# Patient Record
Sex: FEMALE | Race: WHITE | NOT HISPANIC OR LATINO | URBAN - METROPOLITAN AREA
[De-identification: names, ages, dates, MRNs, and addresses within clinical notes are randomized per-mention and may not be internally consistent; named-entity substitution may affect disease eponyms.]

---

## 2023-06-15 ENCOUNTER — ON CAMPUS - OUTPATIENT (OUTPATIENT)
Dept: URBAN - METROPOLITAN AREA HOSPITAL 2 | Facility: HOSPITAL | Age: 63
End: 2023-06-15
Payer: MEDICARE

## 2023-06-15 ENCOUNTER — OFFICE (OUTPATIENT)
Dept: URBAN - METROPOLITAN AREA PATHOLOGY 4 | Facility: PATHOLOGY | Age: 63
End: 2023-06-15
Payer: COMMERCIAL

## 2023-06-15 VITALS
HEART RATE: 74 BPM | OXYGEN SATURATION: 99 % | RESPIRATION RATE: 18 BRPM | TEMPERATURE: 97.7 F | RESPIRATION RATE: 16 BRPM | HEIGHT: 66 IN | DIASTOLIC BLOOD PRESSURE: 89 MMHG | RESPIRATION RATE: 19 BRPM | HEART RATE: 63 BPM | DIASTOLIC BLOOD PRESSURE: 64 MMHG | DIASTOLIC BLOOD PRESSURE: 74 MMHG | RESPIRATION RATE: 20 BRPM | OXYGEN SATURATION: 95 % | SYSTOLIC BLOOD PRESSURE: 110 MMHG | SYSTOLIC BLOOD PRESSURE: 118 MMHG | OXYGEN SATURATION: 92 % | SYSTOLIC BLOOD PRESSURE: 150 MMHG | HEART RATE: 71 BPM | HEART RATE: 68 BPM | DIASTOLIC BLOOD PRESSURE: 78 MMHG | SYSTOLIC BLOOD PRESSURE: 141 MMHG | DIASTOLIC BLOOD PRESSURE: 76 MMHG | DIASTOLIC BLOOD PRESSURE: 90 MMHG | OXYGEN SATURATION: 97 % | DIASTOLIC BLOOD PRESSURE: 95 MMHG | SYSTOLIC BLOOD PRESSURE: 105 MMHG | DIASTOLIC BLOOD PRESSURE: 80 MMHG | DIASTOLIC BLOOD PRESSURE: 71 MMHG | SYSTOLIC BLOOD PRESSURE: 124 MMHG | OXYGEN SATURATION: 98 % | SYSTOLIC BLOOD PRESSURE: 135 MMHG | HEART RATE: 64 BPM | WEIGHT: 176 LBS | SYSTOLIC BLOOD PRESSURE: 153 MMHG | HEART RATE: 77 BPM | HEART RATE: 76 BPM | SYSTOLIC BLOOD PRESSURE: 133 MMHG

## 2023-06-15 DIAGNOSIS — K56.600 PARTIAL INTESTINAL OBSTRUCTION, UNSPECIFIED AS TO CAUSE: ICD-10-CM

## 2023-06-15 DIAGNOSIS — C20 MALIGNANT NEOPLASM OF RECTUM: ICD-10-CM

## 2023-06-15 DIAGNOSIS — Z12.11 ENCOUNTER FOR SCREENING FOR MALIGNANT NEOPLASM OF COLON: ICD-10-CM

## 2023-06-15 DIAGNOSIS — K63.5 POLYP OF COLON: ICD-10-CM

## 2023-06-15 PROBLEM — D37.4 NEOPLASM OF UNCERTAIN BEHAVIOR OF COLON: Status: ACTIVE | Noted: 2023-06-15

## 2023-06-15 PROBLEM — K62.5 HEMORRHAGE OF ANUS AND RECTUM: Status: ACTIVE | Noted: 2023-06-15

## 2023-06-15 LAB
GI HISTOLOGY: A. UNSPECIFIED: (no result)
GI HISTOLOGY: B. UNSPECIFIED: (no result)
GI HISTOLOGY: PDF REPORT: (no result)

## 2023-06-15 PROCEDURE — 45380 COLONOSCOPY AND BIOPSY: CPT | Mod: 33 | Performed by: INTERNAL MEDICINE

## 2023-06-15 PROCEDURE — 88305 TISSUE EXAM BY PATHOLOGIST: CPT | Performed by: INTERNAL MEDICINE

## 2023-06-15 PROCEDURE — 88341 IMHCHEM/IMCYTCHM EA ADD ANTB: CPT | Performed by: INTERNAL MEDICINE

## 2023-06-15 PROCEDURE — 88342 IMHCHEM/IMCYTCHM 1ST ANTB: CPT | Performed by: INTERNAL MEDICINE

## 2023-06-15 RX ADMIN — ONDANSETRON HYDROCHLORIDE 4 MG: 4 SOLUTION ORAL at 08:54

## 2023-07-10 PROBLEM — K44.9 HIATAL HERNIA: Status: ACTIVE | Noted: 2023-06-27

## 2023-07-10 PROBLEM — K21.9 GASTRO-ESOPHAGEAL REFLUX DISEASE WITHOUT ESOPHAGITIS: Status: ACTIVE | Noted: 2021-09-28

## 2023-07-10 PROBLEM — N95.9 MENOPAUSAL AND POSTMENOPAUSAL DISORDER: Status: ACTIVE | Noted: 2022-11-08

## 2023-07-10 PROBLEM — F31.81 BIPOLAR II DISORDER: Status: ACTIVE | Noted: 2022-08-02

## 2023-07-10 PROBLEM — E88.810 METABOLIC SYNDROME: Status: ACTIVE | Noted: 2022-09-08

## 2023-07-10 PROBLEM — F41.1 GENERALIZED ANXIETY DISORDER: Status: ACTIVE | Noted: 2022-08-02

## 2023-07-10 PROBLEM — Z30.2 ENCOUNTER FOR TUBAL LIGATION: Status: ACTIVE | Noted: 2021-09-28

## 2023-07-10 PROBLEM — E78.5 HYPERLIPIDEMIA: Status: ACTIVE | Noted: 2022-09-08

## 2023-07-10 PROBLEM — I10 ESSENTIAL HYPERTENSION: Status: ACTIVE | Noted: 2023-01-10

## 2023-07-10 PROBLEM — Z79.890 HORMONE REPLACEMENT THERAPY: Status: ACTIVE | Noted: 2022-09-08

## 2023-07-10 PROBLEM — F41.9 ANXIETY: Status: ACTIVE | Noted: 2021-08-05

## 2023-07-10 PROBLEM — E88.81 METABOLIC SYNDROME: Status: ACTIVE | Noted: 2022-09-08

## 2023-07-10 PROBLEM — N99.85 POST ENDOMETRIAL ABLATION SYNDROME: Status: ACTIVE | Noted: 2021-09-28

## 2023-07-10 PROBLEM — C20 RECTAL CANCER: Status: ACTIVE | Noted: 2023-06-27

## 2023-07-10 PROBLEM — M47.812 ARTHROPATHY OF CERVICAL FACET JOINT: Status: ACTIVE | Noted: 2017-07-19

## 2023-07-10 PROBLEM — G89.29 CHRONIC PAIN: Status: ACTIVE | Noted: 2023-01-10

## 2023-07-10 PROBLEM — F34.1 DYSTHYMIA: Status: ACTIVE | Noted: 2023-01-10

## 2023-07-11 ENCOUNTER — HOSPITAL ENCOUNTER (OUTPATIENT)
Dept: RADIATION ONCOLOGY | Facility: HOSPITAL | Age: 63
Setting detail: RADIATION/ONCOLOGY SERIES
End: 2023-07-11
Payer: COMMERCIAL

## 2023-09-05 DIAGNOSIS — K52.1 CHEMOTHERAPY INDUCED DIARRHEA: Primary | ICD-10-CM

## 2023-09-05 DIAGNOSIS — T45.1X5A CHEMOTHERAPY INDUCED DIARRHEA: Primary | ICD-10-CM

## 2023-09-05 RX ORDER — DIPHENOXYLATE HYDROCHLORIDE AND ATROPINE SULFATE 2.5; .025 MG/1; MG/1
1-2 TABLET ORAL 4 TIMES DAILY PRN
Qty: 120 TABLET | Refills: 1 | Status: SHIPPED | OUTPATIENT
Start: 2023-09-05

## 2023-10-31 ENCOUNTER — OFFICE VISIT (OUTPATIENT)
Dept: RADIATION ONCOLOGY | Facility: HOSPITAL | Age: 63
End: 2023-10-31
Payer: COMMERCIAL

## 2023-10-31 ENCOUNTER — APPOINTMENT (OUTPATIENT)
Dept: OTHER | Facility: HOSPITAL | Age: 63
End: 2023-10-31
Payer: COMMERCIAL

## 2023-10-31 VITALS
SYSTOLIC BLOOD PRESSURE: 155 MMHG | TEMPERATURE: 98.3 F | WEIGHT: 174.2 LBS | OXYGEN SATURATION: 98 % | HEIGHT: 66 IN | RESPIRATION RATE: 18 BRPM | HEART RATE: 108 BPM | DIASTOLIC BLOOD PRESSURE: 72 MMHG | BODY MASS INDEX: 28 KG/M2

## 2023-10-31 DIAGNOSIS — C20 RECTAL CANCER: Primary | ICD-10-CM

## 2023-10-31 PROCEDURE — G0463 HOSPITAL OUTPT CLINIC VISIT: HCPCS | Performed by: RADIOLOGY

## 2023-10-31 RX ORDER — POTASSIUM CHLORIDE 750 MG/1
10 CAPSULE, EXTENDED RELEASE ORAL 2 TIMES DAILY
COMMUNITY

## 2023-10-31 RX ORDER — ONDANSETRON 4 MG/1
4 TABLET, FILM COATED ORAL EVERY 8 HOURS PRN
COMMUNITY

## 2023-10-31 RX ORDER — PROMETHAZINE HYDROCHLORIDE 12.5 MG/1
12.5 TABLET ORAL EVERY 6 HOURS PRN
COMMUNITY

## 2023-10-31 RX ORDER — LORAZEPAM 1 MG/1
1 TABLET ORAL EVERY 8 HOURS PRN
COMMUNITY

## 2023-10-31 NOTE — PROGRESS NOTES
RADIATION ONCOLOGY RE-EVALUATION NOTE    NAME: Xochitl Hanson  YOB: 1960  MRN #: [id number]  DATE OF SERVICE: 10/31/2023  REFERRING PROVIDER: Vannesa Fairchild MD    DIAGNOSIS: Rectal Adenocarcinoma, moderately differenatiated, T3N1M0     REASON FOR VISIT/CC:  Rectal cancer, Re-Eval during BECK    HISTORY OF PRESENT ILLNESS: The patient is a 63 y.o. year old female who was last seen in our office on 07/13/2023.    Again, her cancer history is as follows:  Xochitl Hanson is a 63 y.o. female who was recently diagnosed with rectal cancer after completing work-up for with ericka rectal bleeding presentation.  Patient had rectal bleeding for several months prior to work-up but amount had increased significantly 2 weeks prior to colonoscopy. No family history of colon cancer. No symptoms of weight loss, rectal pain, or difficulty moving bowels. Previous colonoscopy completed 10 years ago (2013).     She underwent colonoscopy on 6/15/2023 with Dr. Horan at [facility]. Findings include a single sessile 3 mm polyp of benign appearance found in the cecum (polypectomy), and ulcerated mass with stigmata of recent bleeding of malignant appearance found in the rectum at the distance between 12 cm and 16 cm from the anus causing partial obstruction. Multiple cold forceps biopsies were performed. Pathology from the polypectomy revealed mucosal polyp with predominant benign lymphoid aggregate, negative for adenoma. Pathology from the rectal mass revealed invasive moderately differentiated adenocarcinoma with focal mucinous component. No loss of mucoid expression of MMR proteins and no evidence of deficient mismatch pair.  She was referred to Dr. Tamayo.     CT CAP on 6/20/2023 at [facility] showed no evidence of metastatic disease in the chest. Large hiatal hernia. Rectal mass with suggestion of local invasion of adjacent fat with several small but suspicious lymph nodes adjacent to  the lesion. No evidence of distant metastatic disease to the liver or retroperitoneum.      She was seen in consult by Dr. Quintin Tamayo ([facility]) on 6/20/2023. Results of CT CAP pending at that time. Planned to obtain baseline labs including CBC, CMP, and CEA level   MRI of the rectum ordered to complete staging. She was referred to Dr. Fairchild for consideration of surgery.     She was seen in consult by Dr. Vannesa Fairchild ([facility]) on 6/27/2023. Rigid proctoscopy completed in the office. Tumor appeared to be in the upper rectum both on rigid proctoscopy and colonoscopy. If so, patient may be able to avoid XRT. Awaiting MRI and discussion at [facility]-D Tumor Board. Discussed that if early-stage or upper rectal, may go straight to surgery versus neoadjuvant chemo.     MRI at [place]--we are still needing report and copy of the radiology read.     [facility]-D Tumor Board on 7/5/2023 recommended neoadjuvant chemoradiation due to early T3a extension to muscularis propria and 5 mm lymph nodes noted on MRI. No EVMI.      She has had improvement on the neoadjuvant BECK, with great response seen on OSH CT.    No bleeding now  On 7th cycle of neoadjuvant chemo  Plan for 8th cycle of 11/13/2023    CT sim needed for 11/30/2023    She hasn't seen Dr. Fairchild over the interval.      The following portions of the patient's history were reviewed and updated as appropriate: allergies, current medications, past family history, past medical history, past social history, past surgical history and problem list. Reviewed with the patient and remain unchanged.    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    MEDICATIONS:    Current Outpatient Medications:     acetaminophen (TYLENOL) 650 MG 8 hr tablet, Take 1 tablet by mouth Every 8 (Eight) Hours As Needed., Disp: , Rfl:     cetirizine (zyrTEC) 10 MG tablet, Take 1 tablet by mouth Daily., Disp: , Rfl:     diphenoxylate-atropine (Lomotil) 2.5-0.025 MG per tablet, Take 1-2 tablets by mouth 4 (Four) Times a Day As Needed for Diarrhea., Disp: 120 tablet, Rfl: 1    DULoxetine (CYMBALTA) 20 MG capsule, Take 1 capsule by mouth Daily., Disp: , Rfl:     esomeprazole (nexIUM) 40 MG capsule, Take 1 capsule by mouth Every Morning Before Breakfast., Disp: , Rfl:     hydrOXYzine (ATARAX) 50 MG tablet, Take 1 tablet by mouth 3 (Three) Times a Day As Needed., Disp: , Rfl:     ibuprofen (ADVIL,MOTRIN) 200 MG tablet, Take 1 tablet by mouth Every 6 (Six) Hours As Needed., Disp: , Rfl:     lisinopril (PRINIVIL,ZESTRIL) 20 MG tablet, Take 1 tablet by mouth Daily., Disp: , Rfl:     methocarbamol (ROBAXIN) 750 MG tablet, Take 1 tablet by mouth Every 8 (Eight) Hours As Needed., Disp: , Rfl:     multivitamin (THERAGRAN) tablet tablet, Take 1 tablet by mouth Daily., Disp: , Rfl:     ALLERGIES:  No Known Allergies    PAST SURGICAL HISTORY:  she has a past surgical history that includes Tubal ligation and Endometrial ablation.    PREVIOUS RADIOTHERAPY OR CHEMOTHERAPY:  BECK, no XRT    FAMILY HISTORY:  herfamily history includes Prostate cancer in her brother.    SOCIAL HISTORY:  she reports that she has never smoked. She has never used smokeless tobacco. She reports that she does not drink alcohol and does not use drugs.    PAIN AND PAIN MANAGEMENT:  no pain.  Vitals:     "10/31/23 0935   Temp: 98.3 °F (36.8 °C)   TempSrc: Oral   Height: 167.6 cm (66\")       NUTRITIONAL STATUS:   no issues    KPS:  90  PHQ-9 Total Score: distress screening tool completed.    REVIEW OF SYSTEMS:   General: No fevers, chills, weight change, or drenching night sweats. Skin: No rashes or jaundice.  HEENT: No change in vision or hearing, no headaches.  Neck: No dysphagia or masses.  Heme/Lymph: No easy bruising or bleeding.  Respiratory System: No shortness of breath or cough.  Cardiovascular: No chest pain, palpitations, or dyspnea on exertion.  - Pacemaker. GI: No nausea, vomiting, diarrhea, melena, or hematochezia.  : No dysuria or hematuria.  Endocrine: No heat or cold intolerance. Musculoskeletal: No myalgias or arthralgias.  Neuro: No weakness, numbness, syncope, or seizures. Psych: No mood changes or depression. Ext: Denies swelling.    Objective   VITAL SIGNS:  Vitals:    10/31/23 0935   Temp: 98.3 °F (36.8 °C)   TempSrc: Oral   Height: 167.6 cm (66\")     PHYSICAL EXAM:  GENERAL: In no apparent distress, sitting comfortably in room.    HEENT: Normocephalic, atraumatic. Pupils are equal, round, reactive to light. Sclera anicteric. Conjunctiva not injected. Oropharynx without erythema, ulcerations or thrush.   NECK: Supple with no masses.  LYMPHATIC: No cervical, supraclavicular or axillary adenopathy appreciated bilaterally.   CARDIOVASCULAR: S1 & S2 detected; no murmurs, rubs or gallops.  CHEST: Clear to auscultation bilaterally; no wheezes, crackles or rubs. Work of breathing normal.  ABDOMEN: Bowel sounds present. Abdomen is soft, nontender, nondistended.   MUSCULOSKELETAL: No tenderness to palpation along the spine or scapulae. Normal range of motion.  EXTREMITIES: No clubbing, cyanosis, edema.  SKIN: No erythema, rashes, ulcerations noted.   NEUROLOGIC: Cranial nerves II-XII grossly intact bilaterally. No focal neurologic deficits.  PSYCHIATRIC:  Alert, aware, and appropriate.      PERTINENT " IMAGING/PATHOLOGY/LABS (Medical Decision Making):     COORDINATION OF CARE: A copy of this note is sent to the referring provider.    PATHOLOGY (Reviewed):     IMAGING (Reviewed): OSH scans reviewed as noted.    LABS (Reviewed):  HEMATOLOGY:  WBC   Date Value Ref Range Status   07/19/2023 7.40 3.40 - 10.80 10*3/mm3 Final     RBC   Date Value Ref Range Status   07/19/2023 4.59 3.77 - 5.28 10*6/mm3 Final     Hemoglobin   Date Value Ref Range Status   07/19/2023 13.3 12.0 - 15.9 g/dL Final     Hematocrit   Date Value Ref Range Status   07/19/2023 40.7 34.0 - 46.6 % Final     Platelets   Date Value Ref Range Status   07/19/2023 354 140 - 450 10*3/mm3 Final     CHEMISTRY:    Assessment & Plan   ASSESSMENT AND PLAN:    Rectal Adenocarcinoma, moderately differentiated, T3N1M0.  -case has been reviewed and evaluated by Dr. Fairchild and planning for BECK, chemoXRT and eval for NOMS vs. Surgery  -Now at 7 of 8 planned BECK cycles  -good response seen on OSH scans  -CT sim to be ordered and we will coordinate care    This assessment comes from my review of the imaging, pathology, physician notes and other pertinent information as mentioned.    DISPOSITION: CT sim for 11/30/2023 after completion of BECK systemic therapy and then will coordinate start with Dr. Tamayo's team.    TIME SPENT WITH PATIENT:   I spent 22 minutes caring for Xochitl on this date of service. This time includes time spent by me in the following activities: preparing for the visit, reviewing tests, obtaining and/or reviewing a separately obtained history, performing a medically appropriate examination and/or evaluation, counseling and educating the patient/family/caregiver, documenting information in the medical record, independently interpreting results and communicating that information with the patient/family/caregiver, and care coordination    CC: Quintin Tamayo MD      Approved by:     Dave Acosta MD

## 2023-11-28 ENCOUNTER — HOSPITAL ENCOUNTER (OUTPATIENT)
Dept: RADIATION ONCOLOGY | Facility: HOSPITAL | Age: 63
Setting detail: RADIATION/ONCOLOGY SERIES
End: 2023-11-28
Payer: COMMERCIAL

## 2023-11-30 PROCEDURE — 77334 RADIATION TREATMENT AID(S): CPT | Performed by: RADIOLOGY

## 2023-12-07 ENCOUNTER — HOSPITAL ENCOUNTER (OUTPATIENT)
Dept: RADIATION ONCOLOGY | Facility: HOSPITAL | Age: 63
Setting detail: RADIATION/ONCOLOGY SERIES
End: 2023-12-07
Payer: COMMERCIAL

## 2023-12-08 PROCEDURE — 77263 THER RADIOLOGY TX PLNG CPLX: CPT | Performed by: RADIOLOGY

## 2023-12-10 PROCEDURE — 77338 DESIGN MLC DEVICE FOR IMRT: CPT | Performed by: RADIOLOGY

## 2023-12-10 PROCEDURE — 77300 RADIATION THERAPY DOSE PLAN: CPT | Performed by: RADIOLOGY

## 2023-12-10 PROCEDURE — 77301 RADIOTHERAPY DOSE PLAN IMRT: CPT | Performed by: RADIOLOGY

## 2023-12-11 ENCOUNTER — RADIATION ONCOLOGY WEEKLY ASSESSMENT (OUTPATIENT)
Dept: RADIATION ONCOLOGY | Facility: HOSPITAL | Age: 63
End: 2023-12-11
Payer: COMMERCIAL

## 2023-12-11 ENCOUNTER — HOSPITAL ENCOUNTER (OUTPATIENT)
Dept: RADIATION ONCOLOGY | Facility: HOSPITAL | Age: 63
Discharge: HOME | End: 2023-12-11
Payer: COMMERCIAL

## 2023-12-11 VITALS
BODY MASS INDEX: 27.9 KG/M2 | TEMPERATURE: 98.2 F | HEIGHT: 66 IN | HEART RATE: 93 BPM | RESPIRATION RATE: 18 BRPM | OXYGEN SATURATION: 100 % | SYSTOLIC BLOOD PRESSURE: 156 MMHG | WEIGHT: 173.6 LBS | DIASTOLIC BLOOD PRESSURE: 81 MMHG

## 2023-12-11 DIAGNOSIS — C20 RECTAL CANCER: Primary | ICD-10-CM

## 2023-12-11 LAB
RAD ONC ARIA COURSE ID: NORMAL
RAD ONC ARIA COURSE LAST TREATMENT DATE: NORMAL
RAD ONC ARIA COURSE START DATE: NORMAL
RAD ONC ARIA COURSE TREATMENT ELAPSED DAYS: 0
RAD ONC ARIA FIRST TREATMENT DATE: NORMAL
RAD ONC ARIA PLAN FRACTIONS TREATED TO DATE: 1
RAD ONC ARIA PLAN ID: NORMAL
RAD ONC ARIA PLAN PRESCRIBED DOSE PER FRACTION: 1.8 GY
RAD ONC ARIA PLAN PRIMARY REFERENCE POINT: NORMAL
RAD ONC ARIA PLAN TOTAL FRACTIONS PRESCRIBED: 25
RAD ONC ARIA PLAN TOTAL PRESCRIBED DOSE: 4500 CGY
RAD ONC ARIA REFERENCE POINT DOSAGE GIVEN TO DATE: 1.8 GY
RAD ONC ARIA REFERENCE POINT ID: NORMAL
RAD ONC ARIA REFERENCE POINT SESSION DOSAGE GIVEN: 1.8 GY

## 2023-12-11 PROCEDURE — 77386: CPT | Performed by: RADIOLOGY

## 2023-12-11 PROCEDURE — 77427 RADIATION TX MANAGEMENT X5: CPT | Performed by: RADIOLOGY

## 2023-12-11 PROCEDURE — 77014 CHG CT GUIDANCE RADIATION THERAPY FLDS PLACEMENT: CPT | Performed by: RADIOLOGY

## 2023-12-11 PROCEDURE — FACE2FACE: Performed by: RADIOLOGY

## 2023-12-11 NOTE — PROGRESS NOTES
"NAME: Xochitl Hanson DATE: 2023   : 1960    MRN: [id number] DIAGNOSIS:   Encounter Diagnosis   Name Primary?    Rectal cancer Yes          RADIATION ON TREATMENT VISIT NOTE - PELVIS    Treatment Summary    [x] Concurrent Chemo   Regimen: Fluorouracil weekly      Treatment Site Ref. ID Energy Dose/Fx (cGy) #Fx Dose Correction (cGy) Total Dose (cGy) Start Date End Date Elapsed Days   Pelvis init Pelvis 10X 180  0 180 2023 0       General     Review of Systems:  [x] No new complaints  [] Nocturia  [] Slow urinary stream  [] Difficulty initiating urination [] Dysuria  [] Vaginal discharge  [] Increased frequency of urination [] Soft/loose stool [] Diarrhea  [] Rectal burning   [] Skin soreness, location:   [x] Fatigue, severity: 0  [x] Pain, severity: 0, location: denies pain  [] Bladder medication regimen:    [] Pain medication regimen:    [x] Bowel regimen:  discussed imodium  [x] Skin regimen:  discussed aquaphor    Subjective:  She started treatments today, she doesn't have any concerns or complaints at this time.    KPS: 90     Vital Signs: /81   Pulse 93   Temp 98.2 °F (36.8 °C) (Oral)   Resp 18   Ht 167.6 cm (66\")   Wt 78.7 kg (173 lb 9.6 oz)   SpO2 100%   BMI 28.02 kg/m²     Weight:   Wt Readings from Last 3 Encounters:   23 78.7 kg (173 lb 9.6 oz)   10/31/23 79 kg (174 lb 3.2 oz)   23 83.9 kg (185 lb)       Medication:   Current Outpatient Medications:     acetaminophen (TYLENOL) 650 MG 8 hr tablet, Take 1 tablet by mouth Every 8 (Eight) Hours As Needed., Disp: , Rfl:     cetirizine (zyrTEC) 10 MG tablet, Take 1 tablet by mouth Daily., Disp: , Rfl:     diphenoxylate-atropine (Lomotil) 2.5-0.025 MG per tablet, Take 1-2 tablets by mouth 4 (Four) Times a Day As Needed for Diarrhea., Disp: 120 tablet, Rfl: 1    DULoxetine (CYMBALTA) 20 MG capsule, Take 1 capsule by mouth Daily., Disp: , Rfl:     esomeprazole (nexIUM) 40 MG capsule, Take 1 capsule by " "mouth Every Morning Before Breakfast., Disp: , Rfl:     hydrOXYzine (ATARAX) 50 MG tablet, Take 1 tablet by mouth 3 (Three) Times a Day As Needed. (Patient not taking: Reported on 10/31/2023), Disp: , Rfl:     ibuprofen (ADVIL,MOTRIN) 200 MG tablet, Take 1 tablet by mouth Every 6 (Six) Hours As Needed., Disp: , Rfl:     lisinopril (PRINIVIL,ZESTRIL) 20 MG tablet, Take 1 tablet by mouth Daily., Disp: , Rfl:     LORazepam (ATIVAN) 1 MG tablet, Take 1 tablet by mouth Every 8 (Eight) Hours As Needed for Anxiety., Disp: , Rfl:     methocarbamol (ROBAXIN) 750 MG tablet, Take 1 tablet by mouth Every 8 (Eight) Hours As Needed., Disp: , Rfl:     multivitamin (THERAGRAN) tablet tablet, Take 1 tablet by mouth Daily., Disp: , Rfl:     ondansetron (ZOFRAN) 4 MG tablet, Take 1 tablet by mouth Every 8 (Eight) Hours As Needed for Nausea or Vomiting., Disp: , Rfl:     potassium chloride (MICRO-K) 10 MEQ CR capsule, Take 1 capsule by mouth 2 (Two) Times a Day., Disp: , Rfl:     promethazine (PHENERGAN) 12.5 MG tablet, Take 1 tablet by mouth Every 6 (Six) Hours As Needed for Nausea or Vomiting., Disp: , Rfl:      LABS (Reviewed):  Hematology  WBC   Date Value Ref Range Status   07/19/2023 7.40 3.40 - 10.80 10*3/mm3 Final     RBC   Date Value Ref Range Status   07/19/2023 4.59 3.77 - 5.28 10*6/mm3 Final     Hemoglobin   Date Value Ref Range Status   07/19/2023 13.3 12.0 - 15.9 g/dL Final     Hematocrit   Date Value Ref Range Status   07/19/2023 40.7 34.0 - 46.6 % Final     Platelets   Date Value Ref Range Status   07/19/2023 354 140 - 450 10*3/mm3 Final     Chemistry No results found for: \"GLUCOSE\", \"BUN\", \"CREATININE\", \"EGFRIFNONA\", \"EGFRIFAFRI\", \"BCR\", \"K\", \"CO2\", \"CALCIUM\", \"PROTENTOTREF\", \"ALBUMIN\", \"LABIL2\", \"BILIRUBIN\", \"AST\", \"ALT\"    Physical Exam     Abdomen: [x] Soft          [x] Bowel sounds   GYN: [x] No Vaginal discharge   Extremities: [] Edema   GI: [x] No Diarrhea   [] Enteritis  [] Proctitis    [] Vomiting "   Renal/Genitourinary: [] Cystitis     [] Bladder spasms  [] Incontinence   Skin: [x] stGstrstastdstest:st st1st Comments/Notes:      [x] Review of labs, images, dosimetry, dose delivered, & treatment parameters.    Comments:     [x] Patient treatment setup reviewed.    Comments:     Recommendations:  continue XRT and supportive measures    [x] Continue RT  [] Change RT Plan [] Hold RT, length:      Approved Electronically By:  Dave Acosta MD, 12/11/2023, 15:19 EST      Confidentiality of this medical record shall be maintained except when use or disclosure is required or permitted by law, regulation or written authorization by the patient.

## 2023-12-12 ENCOUNTER — HOSPITAL ENCOUNTER (OUTPATIENT)
Dept: RADIATION ONCOLOGY | Facility: HOSPITAL | Age: 63
Discharge: HOME | End: 2023-12-12

## 2023-12-12 LAB
RAD ONC ARIA COURSE ID: NORMAL
RAD ONC ARIA COURSE LAST TREATMENT DATE: NORMAL
RAD ONC ARIA COURSE START DATE: NORMAL
RAD ONC ARIA COURSE TREATMENT ELAPSED DAYS: 1
RAD ONC ARIA FIRST TREATMENT DATE: NORMAL
RAD ONC ARIA PLAN FRACTIONS TREATED TO DATE: 2
RAD ONC ARIA PLAN ID: NORMAL
RAD ONC ARIA PLAN PRESCRIBED DOSE PER FRACTION: 1.8 GY
RAD ONC ARIA PLAN PRIMARY REFERENCE POINT: NORMAL
RAD ONC ARIA PLAN TOTAL FRACTIONS PRESCRIBED: 25
RAD ONC ARIA PLAN TOTAL PRESCRIBED DOSE: 4500 CGY
RAD ONC ARIA REFERENCE POINT DOSAGE GIVEN TO DATE: 3.6 GY
RAD ONC ARIA REFERENCE POINT ID: NORMAL
RAD ONC ARIA REFERENCE POINT SESSION DOSAGE GIVEN: 1.8 GY

## 2023-12-12 PROCEDURE — 77014 CHG CT GUIDANCE RADIATION THERAPY FLDS PLACEMENT: CPT | Performed by: RADIOLOGY

## 2023-12-12 PROCEDURE — 77386: CPT | Performed by: RADIOLOGY

## 2023-12-13 ENCOUNTER — HOSPITAL ENCOUNTER (OUTPATIENT)
Dept: RADIATION ONCOLOGY | Facility: HOSPITAL | Age: 63
Discharge: HOME | End: 2023-12-13

## 2023-12-13 LAB
RAD ONC ARIA COURSE ID: NORMAL
RAD ONC ARIA COURSE LAST TREATMENT DATE: NORMAL
RAD ONC ARIA COURSE START DATE: NORMAL
RAD ONC ARIA COURSE TREATMENT ELAPSED DAYS: 2
RAD ONC ARIA FIRST TREATMENT DATE: NORMAL
RAD ONC ARIA PLAN FRACTIONS TREATED TO DATE: 3
RAD ONC ARIA PLAN ID: NORMAL
RAD ONC ARIA PLAN PRESCRIBED DOSE PER FRACTION: 1.8 GY
RAD ONC ARIA PLAN PRIMARY REFERENCE POINT: NORMAL
RAD ONC ARIA PLAN TOTAL FRACTIONS PRESCRIBED: 25
RAD ONC ARIA PLAN TOTAL PRESCRIBED DOSE: 4500 CGY
RAD ONC ARIA REFERENCE POINT DOSAGE GIVEN TO DATE: 5.4 GY
RAD ONC ARIA REFERENCE POINT ID: NORMAL
RAD ONC ARIA REFERENCE POINT SESSION DOSAGE GIVEN: 1.8 GY

## 2023-12-13 PROCEDURE — 77386: CPT | Performed by: RADIOLOGY

## 2023-12-13 PROCEDURE — 77336 RADIATION PHYSICS CONSULT: CPT | Performed by: RADIOLOGY

## 2023-12-13 PROCEDURE — 77014 CHG CT GUIDANCE RADIATION THERAPY FLDS PLACEMENT: CPT | Performed by: RADIOLOGY

## 2023-12-14 ENCOUNTER — HOSPITAL ENCOUNTER (OUTPATIENT)
Dept: RADIATION ONCOLOGY | Facility: HOSPITAL | Age: 63
Discharge: HOME | End: 2023-12-14

## 2023-12-14 LAB
RAD ONC ARIA COURSE ID: NORMAL
RAD ONC ARIA COURSE LAST TREATMENT DATE: NORMAL
RAD ONC ARIA COURSE START DATE: NORMAL
RAD ONC ARIA COURSE TREATMENT ELAPSED DAYS: 3
RAD ONC ARIA FIRST TREATMENT DATE: NORMAL
RAD ONC ARIA PLAN FRACTIONS TREATED TO DATE: 4
RAD ONC ARIA PLAN ID: NORMAL
RAD ONC ARIA PLAN PRESCRIBED DOSE PER FRACTION: 1.8 GY
RAD ONC ARIA PLAN PRIMARY REFERENCE POINT: NORMAL
RAD ONC ARIA PLAN TOTAL FRACTIONS PRESCRIBED: 25
RAD ONC ARIA PLAN TOTAL PRESCRIBED DOSE: 4500 CGY
RAD ONC ARIA REFERENCE POINT DOSAGE GIVEN TO DATE: 7.2 GY
RAD ONC ARIA REFERENCE POINT ID: NORMAL
RAD ONC ARIA REFERENCE POINT SESSION DOSAGE GIVEN: 1.8 GY

## 2023-12-14 PROCEDURE — 77014 CHG CT GUIDANCE RADIATION THERAPY FLDS PLACEMENT: CPT | Performed by: RADIOLOGY

## 2023-12-14 PROCEDURE — 77386: CPT | Performed by: RADIOLOGY

## 2023-12-15 ENCOUNTER — HOSPITAL ENCOUNTER (OUTPATIENT)
Dept: RADIATION ONCOLOGY | Facility: HOSPITAL | Age: 63
Discharge: HOME | End: 2023-12-15

## 2023-12-15 LAB
RAD ONC ARIA COURSE ID: NORMAL
RAD ONC ARIA COURSE LAST TREATMENT DATE: NORMAL
RAD ONC ARIA COURSE START DATE: NORMAL
RAD ONC ARIA COURSE TREATMENT ELAPSED DAYS: 4
RAD ONC ARIA FIRST TREATMENT DATE: NORMAL
RAD ONC ARIA PLAN FRACTIONS TREATED TO DATE: 5
RAD ONC ARIA PLAN ID: NORMAL
RAD ONC ARIA PLAN PRESCRIBED DOSE PER FRACTION: 1.8 GY
RAD ONC ARIA PLAN PRIMARY REFERENCE POINT: NORMAL
RAD ONC ARIA PLAN TOTAL FRACTIONS PRESCRIBED: 25
RAD ONC ARIA PLAN TOTAL PRESCRIBED DOSE: 4500 CGY
RAD ONC ARIA REFERENCE POINT DOSAGE GIVEN TO DATE: 9 GY
RAD ONC ARIA REFERENCE POINT ID: NORMAL
RAD ONC ARIA REFERENCE POINT SESSION DOSAGE GIVEN: 1.8 GY

## 2023-12-15 PROCEDURE — 77386: CPT | Performed by: INTERNAL MEDICINE

## 2023-12-15 PROCEDURE — 77014 CHG CT GUIDANCE RADIATION THERAPY FLDS PLACEMENT: CPT | Performed by: INTERNAL MEDICINE

## 2023-12-18 ENCOUNTER — RADIATION ONCOLOGY WEEKLY ASSESSMENT (OUTPATIENT)
Dept: RADIATION ONCOLOGY | Facility: HOSPITAL | Age: 63
End: 2023-12-18
Payer: COMMERCIAL

## 2023-12-18 ENCOUNTER — HOSPITAL ENCOUNTER (OUTPATIENT)
Dept: RADIATION ONCOLOGY | Facility: HOSPITAL | Age: 63
Discharge: HOME | End: 2023-12-18
Payer: COMMERCIAL

## 2023-12-18 VITALS
SYSTOLIC BLOOD PRESSURE: 128 MMHG | HEART RATE: 101 BPM | DIASTOLIC BLOOD PRESSURE: 78 MMHG | OXYGEN SATURATION: 96 % | RESPIRATION RATE: 18 BRPM | BODY MASS INDEX: 28.08 KG/M2 | WEIGHT: 174 LBS

## 2023-12-18 DIAGNOSIS — C20 RECTAL CANCER: Primary | ICD-10-CM

## 2023-12-18 LAB
RAD ONC ARIA COURSE ID: NORMAL
RAD ONC ARIA COURSE LAST TREATMENT DATE: NORMAL
RAD ONC ARIA COURSE START DATE: NORMAL
RAD ONC ARIA COURSE TREATMENT ELAPSED DAYS: 7
RAD ONC ARIA FIRST TREATMENT DATE: NORMAL
RAD ONC ARIA PLAN FRACTIONS TREATED TO DATE: 6
RAD ONC ARIA PLAN ID: NORMAL
RAD ONC ARIA PLAN PRESCRIBED DOSE PER FRACTION: 1.8 GY
RAD ONC ARIA PLAN PRIMARY REFERENCE POINT: NORMAL
RAD ONC ARIA PLAN TOTAL FRACTIONS PRESCRIBED: 25
RAD ONC ARIA PLAN TOTAL PRESCRIBED DOSE: 4500 CGY
RAD ONC ARIA REFERENCE POINT DOSAGE GIVEN TO DATE: 10.8 GY
RAD ONC ARIA REFERENCE POINT ID: NORMAL
RAD ONC ARIA REFERENCE POINT SESSION DOSAGE GIVEN: 1.8 GY

## 2023-12-18 PROCEDURE — 77014 CHG CT GUIDANCE RADIATION THERAPY FLDS PLACEMENT: CPT | Performed by: RADIOLOGY

## 2023-12-18 PROCEDURE — FACE2FACE: Performed by: RADIOLOGY

## 2023-12-18 PROCEDURE — 77386: CPT | Performed by: RADIOLOGY

## 2023-12-18 PROCEDURE — 77427 RADIATION TX MANAGEMENT X5: CPT | Performed by: RADIOLOGY

## 2023-12-18 NOTE — PROGRESS NOTES
NAME: Xochitl Hanson DATE: 2023   : 1960    MRN: [id number] DIAGNOSIS:   Encounter Diagnosis   Name Primary?    Rectal cancer Yes          RADIATION ON TREATMENT VISIT NOTE - PELVIS    Treatment Summary    [x] Concurrent Chemo   Regimen:  5FU weekly ([facility])      Treatment Site Ref. ID Energy Dose/Fx (cGy) #Fx Dose Correction (cGy) Total Dose (cGy) Start Date End Date Elapsed Days   Pelvis init Pelvis 10X 180  0 1,080 2023 7       General     Review of Systems:  [] No new complaints  [] Nocturia  [] Slow urinary stream  [] Difficulty initiating urination [] Dysuria  [] Vaginal discharge  [] Increased frequency of urination [] Soft/loose stool [] Diarrhea  [] Rectal burning   [] Skin soreness, location:   [x] Fatigue, severity: 1  [] Pain, severity: 0, location:   [] Bladder medication regimen:    [] Pain medication regimen:    [] Bowel regimen:  Lomotil PRN  [] Skin regimen:      Subjective:  No complaints at this time.    KPS: 90     Vital Signs: /78   Pulse 101   Resp 18   Wt 78.9 kg (174 lb)   SpO2 96%   BMI 28.08 kg/m²     Weight:   Wt Readings from Last 3 Encounters:   23 78.9 kg (174 lb)   23 78.7 kg (173 lb 9.6 oz)   10/31/23 79 kg (174 lb 3.2 oz)       Medication:   Current Outpatient Medications:     acetaminophen (TYLENOL) 650 MG 8 hr tablet, Take 1 tablet by mouth Every 8 (Eight) Hours As Needed., Disp: , Rfl:     cetirizine (zyrTEC) 10 MG tablet, Take 1 tablet by mouth Daily., Disp: , Rfl:     diphenoxylate-atropine (Lomotil) 2.5-0.025 MG per tablet, Take 1-2 tablets by mouth 4 (Four) Times a Day As Needed for Diarrhea., Disp: 120 tablet, Rfl: 1    DULoxetine (CYMBALTA) 20 MG capsule, Take 1 capsule by mouth Daily., Disp: , Rfl:     esomeprazole (nexIUM) 40 MG capsule, Take 1 capsule by mouth Every Morning Before Breakfast., Disp: , Rfl:     hydrOXYzine (ATARAX) 50 MG tablet, Take 1 tablet by mouth 3 (Three) Times a Day As Needed. (Patient not  taking: Reported on 10/31/2023), Disp: , Rfl:     ibuprofen (ADVIL,MOTRIN) 200 MG tablet, Take 1 tablet by mouth Every 6 (Six) Hours As Needed., Disp: , Rfl:     lisinopril (PRINIVIL,ZESTRIL) 20 MG tablet, Take 1 tablet by mouth Daily., Disp: , Rfl:     LORazepam (ATIVAN) 1 MG tablet, Take 1 tablet by mouth Every 8 (Eight) Hours As Needed for Anxiety., Disp: , Rfl:     methocarbamol (ROBAXIN) 750 MG tablet, Take 1 tablet by mouth Every 8 (Eight) Hours As Needed., Disp: , Rfl:     multivitamin (THERAGRAN) tablet tablet, Take 1 tablet by mouth Daily., Disp: , Rfl:     ondansetron (ZOFRAN) 4 MG tablet, Take 1 tablet by mouth Every 8 (Eight) Hours As Needed for Nausea or Vomiting., Disp: , Rfl:     potassium chloride (MICRO-K) 10 MEQ CR capsule, Take 1 capsule by mouth 2 (Two) Times a Day., Disp: , Rfl:     promethazine (PHENERGAN) 12.5 MG tablet, Take 1 tablet by mouth Every 6 (Six) Hours As Needed for Nausea or Vomiting., Disp: , Rfl:      LABS (Reviewed):  Hematology  WBC   Date Value Ref Range Status   07/19/2023 7.40 3.40 - 10.80 10*3/mm3 Final     RBC   Date Value Ref Range Status   07/19/2023 4.59 3.77 - 5.28 10*6/mm3 Final     Hemoglobin   Date Value Ref Range Status   07/19/2023 13.3 12.0 - 15.9 g/dL Final     Hematocrit   Date Value Ref Range Status   07/19/2023 40.7 34.0 - 46.6 % Final     Platelets   Date Value Ref Range Status   07/19/2023 354 140 - 450 10*3/mm3 Final     Chemistry     Physical Exam     Abdomen: [x] Soft          [x] Bowel sounds   GYN: [] Vaginal discharge   Extremities: [] Edema   GI: [] Diarrhea   [] Enteritis  [] Proctitis    [] Vomiting   Renal/Genitourinary: [] Cystitis     [] Bladder spasms  [] Incontinence   Skin: [x] stGstrstastdstest:st st1st Comments/Notes:  ctab  RRR  No flank or suprapubic tenderness    Chemo and labs at [facility]    [x] Review of labs, images, dosimetry, dose delivered, & treatment parameters.    Comments:     [x] Patient treatment setup reviewed.    Comments:      Recommendations:  continue XRT and supportive measures  Doing well.    [x] Continue RT  [] Change RT Plan [] Hold RT, length:      Approved Electronically By:  Dave Acosta MD, 12/18/2023, 10:40 EST      Confidentiality of this medical record shall be maintained except when use or disclosure is required or permitted by law, regulation or written authorization by the patient.     No

## 2023-12-19 ENCOUNTER — HOSPITAL ENCOUNTER (OUTPATIENT)
Dept: RADIATION ONCOLOGY | Facility: HOSPITAL | Age: 63
Discharge: HOME | End: 2023-12-19

## 2023-12-19 LAB
RAD ONC ARIA COURSE ID: NORMAL
RAD ONC ARIA COURSE LAST TREATMENT DATE: NORMAL
RAD ONC ARIA COURSE START DATE: NORMAL
RAD ONC ARIA COURSE TREATMENT ELAPSED DAYS: 8
RAD ONC ARIA FIRST TREATMENT DATE: NORMAL
RAD ONC ARIA PLAN FRACTIONS TREATED TO DATE: 7
RAD ONC ARIA PLAN ID: NORMAL
RAD ONC ARIA PLAN PRESCRIBED DOSE PER FRACTION: 1.8 GY
RAD ONC ARIA PLAN PRIMARY REFERENCE POINT: NORMAL
RAD ONC ARIA PLAN TOTAL FRACTIONS PRESCRIBED: 25
RAD ONC ARIA PLAN TOTAL PRESCRIBED DOSE: 4500 CGY
RAD ONC ARIA REFERENCE POINT DOSAGE GIVEN TO DATE: 12.6 GY
RAD ONC ARIA REFERENCE POINT ID: NORMAL
RAD ONC ARIA REFERENCE POINT SESSION DOSAGE GIVEN: 1.8 GY

## 2023-12-19 PROCEDURE — 77014 CHG CT GUIDANCE RADIATION THERAPY FLDS PLACEMENT: CPT | Performed by: RADIOLOGY

## 2023-12-19 PROCEDURE — 77386: CPT | Performed by: RADIOLOGY

## 2023-12-20 ENCOUNTER — HOSPITAL ENCOUNTER (OUTPATIENT)
Dept: RADIATION ONCOLOGY | Facility: HOSPITAL | Age: 63
Discharge: HOME | End: 2023-12-20

## 2023-12-20 LAB
RAD ONC ARIA COURSE ID: NORMAL
RAD ONC ARIA COURSE LAST TREATMENT DATE: NORMAL
RAD ONC ARIA COURSE START DATE: NORMAL
RAD ONC ARIA COURSE TREATMENT ELAPSED DAYS: 9
RAD ONC ARIA FIRST TREATMENT DATE: NORMAL
RAD ONC ARIA PLAN FRACTIONS TREATED TO DATE: 8
RAD ONC ARIA PLAN ID: NORMAL
RAD ONC ARIA PLAN PRESCRIBED DOSE PER FRACTION: 1.8 GY
RAD ONC ARIA PLAN PRIMARY REFERENCE POINT: NORMAL
RAD ONC ARIA PLAN TOTAL FRACTIONS PRESCRIBED: 25
RAD ONC ARIA PLAN TOTAL PRESCRIBED DOSE: 4500 CGY
RAD ONC ARIA REFERENCE POINT DOSAGE GIVEN TO DATE: 14.4 GY
RAD ONC ARIA REFERENCE POINT ID: NORMAL
RAD ONC ARIA REFERENCE POINT SESSION DOSAGE GIVEN: 1.8 GY

## 2023-12-20 PROCEDURE — 77386: CPT | Performed by: RADIOLOGY

## 2023-12-20 PROCEDURE — 77014 CHG CT GUIDANCE RADIATION THERAPY FLDS PLACEMENT: CPT | Performed by: RADIOLOGY

## 2023-12-21 ENCOUNTER — HOSPITAL ENCOUNTER (OUTPATIENT)
Dept: RADIATION ONCOLOGY | Facility: HOSPITAL | Age: 63
Discharge: HOME | End: 2023-12-21

## 2023-12-21 LAB
RAD ONC ARIA COURSE ID: NORMAL
RAD ONC ARIA COURSE LAST TREATMENT DATE: NORMAL
RAD ONC ARIA COURSE START DATE: NORMAL
RAD ONC ARIA COURSE TREATMENT ELAPSED DAYS: 10
RAD ONC ARIA FIRST TREATMENT DATE: NORMAL
RAD ONC ARIA PLAN FRACTIONS TREATED TO DATE: 9
RAD ONC ARIA PLAN ID: NORMAL
RAD ONC ARIA PLAN PRESCRIBED DOSE PER FRACTION: 1.8 GY
RAD ONC ARIA PLAN PRIMARY REFERENCE POINT: NORMAL
RAD ONC ARIA PLAN TOTAL FRACTIONS PRESCRIBED: 25
RAD ONC ARIA PLAN TOTAL PRESCRIBED DOSE: 4500 CGY
RAD ONC ARIA REFERENCE POINT DOSAGE GIVEN TO DATE: 16.2 GY
RAD ONC ARIA REFERENCE POINT ID: NORMAL
RAD ONC ARIA REFERENCE POINT SESSION DOSAGE GIVEN: 1.8 GY

## 2023-12-21 PROCEDURE — 77014 CHG CT GUIDANCE RADIATION THERAPY FLDS PLACEMENT: CPT | Performed by: RADIOLOGY

## 2023-12-21 PROCEDURE — 77336 RADIATION PHYSICS CONSULT: CPT | Performed by: RADIOLOGY

## 2023-12-21 PROCEDURE — 77386: CPT | Performed by: RADIOLOGY

## 2023-12-22 ENCOUNTER — HOSPITAL ENCOUNTER (OUTPATIENT)
Dept: RADIATION ONCOLOGY | Facility: HOSPITAL | Age: 63
Discharge: HOME | End: 2023-12-22

## 2023-12-22 LAB
RAD ONC ARIA COURSE ID: NORMAL
RAD ONC ARIA COURSE LAST TREATMENT DATE: NORMAL
RAD ONC ARIA COURSE START DATE: NORMAL
RAD ONC ARIA COURSE TREATMENT ELAPSED DAYS: 11
RAD ONC ARIA FIRST TREATMENT DATE: NORMAL
RAD ONC ARIA PLAN FRACTIONS TREATED TO DATE: 10
RAD ONC ARIA PLAN ID: NORMAL
RAD ONC ARIA PLAN PRESCRIBED DOSE PER FRACTION: 1.8 GY
RAD ONC ARIA PLAN PRIMARY REFERENCE POINT: NORMAL
RAD ONC ARIA PLAN TOTAL FRACTIONS PRESCRIBED: 25
RAD ONC ARIA PLAN TOTAL PRESCRIBED DOSE: 4500 CGY
RAD ONC ARIA REFERENCE POINT DOSAGE GIVEN TO DATE: 18 GY
RAD ONC ARIA REFERENCE POINT ID: NORMAL
RAD ONC ARIA REFERENCE POINT SESSION DOSAGE GIVEN: 1.8 GY

## 2023-12-22 PROCEDURE — 77386: CPT | Performed by: RADIOLOGY

## 2023-12-22 PROCEDURE — 77014 CHG CT GUIDANCE RADIATION THERAPY FLDS PLACEMENT: CPT | Performed by: RADIOLOGY

## 2023-12-26 ENCOUNTER — TELEPHONE (OUTPATIENT)
Dept: ONCOLOGY | Facility: CLINIC | Age: 63
End: 2023-12-26

## 2023-12-26 ENCOUNTER — HOSPITAL ENCOUNTER (OUTPATIENT)
Dept: RADIATION ONCOLOGY | Facility: HOSPITAL | Age: 63
Discharge: HOME | End: 2023-12-26

## 2023-12-26 ENCOUNTER — RADIATION ONCOLOGY WEEKLY ASSESSMENT (OUTPATIENT)
Dept: RADIATION ONCOLOGY | Facility: HOSPITAL | Age: 63
End: 2023-12-26
Payer: COMMERCIAL

## 2023-12-26 VITALS
HEART RATE: 86 BPM | OXYGEN SATURATION: 98 % | RESPIRATION RATE: 18 BRPM | BODY MASS INDEX: 28.25 KG/M2 | SYSTOLIC BLOOD PRESSURE: 150 MMHG | WEIGHT: 175 LBS | DIASTOLIC BLOOD PRESSURE: 87 MMHG

## 2023-12-26 DIAGNOSIS — C20 RECTAL CANCER: Primary | ICD-10-CM

## 2023-12-26 LAB
RAD ONC ARIA COURSE ID: NORMAL
RAD ONC ARIA COURSE LAST TREATMENT DATE: NORMAL
RAD ONC ARIA COURSE START DATE: NORMAL
RAD ONC ARIA COURSE TREATMENT ELAPSED DAYS: 15
RAD ONC ARIA FIRST TREATMENT DATE: NORMAL
RAD ONC ARIA PLAN FRACTIONS TREATED TO DATE: 11
RAD ONC ARIA PLAN ID: NORMAL
RAD ONC ARIA PLAN PRESCRIBED DOSE PER FRACTION: 1.8 GY
RAD ONC ARIA PLAN PRIMARY REFERENCE POINT: NORMAL
RAD ONC ARIA PLAN TOTAL FRACTIONS PRESCRIBED: 25
RAD ONC ARIA PLAN TOTAL PRESCRIBED DOSE: 4500 CGY
RAD ONC ARIA REFERENCE POINT DOSAGE GIVEN TO DATE: 19.8 GY
RAD ONC ARIA REFERENCE POINT ID: NORMAL
RAD ONC ARIA REFERENCE POINT SESSION DOSAGE GIVEN: 1.8 GY

## 2023-12-26 PROCEDURE — 77427 RADIATION TX MANAGEMENT X5: CPT | Performed by: RADIOLOGY

## 2023-12-26 PROCEDURE — 77014 CHG CT GUIDANCE RADIATION THERAPY FLDS PLACEMENT: CPT | Performed by: RADIOLOGY

## 2023-12-26 PROCEDURE — 77386: CPT | Performed by: RADIOLOGY

## 2023-12-26 NOTE — PROGRESS NOTES
HEMATOLOGY ONCOLOGY OUTPATIENT CONSULTATION       Patient name: Xochitl Hanson  : 1960  MRN: [id number]  Primary Care Physician: Provider, No Known  Referring Physician: Quintin Tamayo MD  Reason For Consult:       History of Present Illness:  Patient is a 63 y.o. female with known diagnosis of stage III adenocarcinoma of the rectum who is presented today to establish her care.  She was previously following with Dr. Tamayo.  Her oncologic history is summarized as follows:    Patient had rectal bleeding for several months but amount had increased significantly During 2023. No symptoms of weight loss, rectal pain, or difficulty moving bowels. Previous colonoscopy completed 10 years ago ().     She underwent colonoscopy on 6/15/2023 with Dr. Horan at [facility]. Findings include a single sessile 3 mm polyp of benign appearance found in the cecum (polypectomy), and ulcerated mass with stigmata of recent bleeding of malignant appearance found in the rectum at the distance between 12 cm and 16 cm from the anus causing partial obstruction. Multiple cold forceps biopsies were performed. Pathology from the polypectomy revealed mucosal polyp with predominant benign lymphoid aggregate, negative for adenoma. Pathology from the rectal mass revealed invasive moderately differentiated adenocarcinoma with focal mucinous component. No loss of mucoid expression of MMR proteins and no evidence of deficient mismatch pair.  She was referred to Dr. Tamayo.     CT CAP on 2023 at [facility] showed no evidence of metastatic disease in the chest. Large hiatal hernia. Rectal mass with suggestion of local invasion of adjacent fat with several small but suspicious lymph nodes adjacent to the lesion. No evidence of distant metastatic disease to the liver or retroperitoneum.      She was seen in consult by Dr. Quintin Tamayo ([facility]) on 2023.  Planned to  obtain baseline labs including CBC, CMP, and CEA level   MRI of the rectum ordered to complete staging. She was referred to Dr. Fairchild for consideration of surgery.     She was seen in consult by Dr. Vannesa Fairchild ([facility]) on 6/27/2023. Rigid proctoscopy completed in the office. Tumor appeared to be in the upper rectum both on rigid proctoscopy and colonoscopy. If so, patient may be able to avoid XRT. Awaiting MRI and discussion at [facility]D Tumor Board. Discussed that if early-stage or upper rectal, may go straight to surgery versus neoadjuvant chemo.     MRI at [place]-- Not available presently.     [facility] on 7/5/2023 recommended neoadjuvant chemoradiation due to early T3a extension to muscularis propria and 5 mm lymph nodes noted on MRI. No EVMI.    7/27/23: Patient started Neoadjuvant Chemotherapy with FOLFOXIRI.  Cycle 1 was complicated by an infusion reaction to irinotecan which was managed with dexamethasone, Pepcid and Benadryl.  PEG filgrastim was added with cycle 2 FOLFOXIRI due to cytopenias.    7/27/2023: C1 D1 FOLFOXIRI  8/15/2023: C2 D1 FOLFOXIRI  10/9/2023: C6 D1 FOLFOXIRI  10/20/2023: Chemotherapy dose reduced by 20% due to thrombocytopenia.    10/27/2023: CT chest abdomen pelvis with contrast done at [facility] revealed patient's known rectal malignancy not well-visualized.  There was decreased surrounding perirectal fat stranding and no evidence of abdominopelvic metastatic disease.  CT chest was negative for any evidence of metastatic disease.    11/13/2023: C8-D1 FOLFOXIRI     11/30/2023: CT simulation for neoadjuvant radiation with concurrent 5-FU.    12/11/2023: Patient started on neoadjuvant radiation with weekly 5-FU infusion.       Subjective:  Patient presents for initial consultation today, She is undergoing neoadjuvant ChemoRT and is tolerating therapy relatively well. Denied any significant treatment related adverse effects presently,  except for fatigue. No weight/appetite changes, no Rectal bleeding. Cancer history as above.      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    Rectal cancer 06/27/2023       Past Surgical History:   Procedure Laterality Date    ENDOMETRIAL ABLATION      TUBAL ABDOMINAL LIGATION           Current Outpatient Medications:     acetaminophen (TYLENOL) 650 MG 8 hr tablet, Take 1 tablet by mouth Every 8 (Eight) Hours As Needed., Disp: , Rfl:     cetirizine (zyrTEC) 10 MG tablet, Take 1 tablet by mouth Daily., Disp: , Rfl:     diphenoxylate-atropine (Lomotil) 2.5-0.025 MG per tablet, Take 1-2 tablets by mouth 4 (Four) Times a Day As Needed for Diarrhea., Disp: 120 tablet, Rfl: 1    DULoxetine (CYMBALTA) 20 MG capsule, Take 1 capsule by mouth Daily., Disp: , Rfl:     esomeprazole (nexIUM) 40 MG capsule, Take 1 capsule by mouth Every Morning Before Breakfast., Disp: , Rfl:     hydrOXYzine (ATARAX) 50 MG tablet, Take 1 tablet by mouth 3 (Three) Times a Day As Needed. (Patient not taking: Reported on 10/31/2023), Disp: , Rfl:     ibuprofen (ADVIL,MOTRIN) 200 MG tablet, Take 1 tablet by mouth Every 6 (Six) Hours As Needed., Disp: , Rfl:     lisinopril (PRINIVIL,ZESTRIL) 20 MG tablet, Take 1 tablet by mouth Daily., Disp: , Rfl:     LORazepam (ATIVAN) 1 MG tablet, Take 1 tablet by mouth Every 8 (Eight) Hours As Needed for Anxiety., Disp: , Rfl:     methocarbamol  "(ROBAXIN) 750 MG tablet, Take 1 tablet by mouth Every 8 (Eight) Hours As Needed., Disp: , Rfl:     multivitamin (THERAGRAN) tablet tablet, Take 1 tablet by mouth Daily., Disp: , Rfl:     ondansetron (ZOFRAN) 4 MG tablet, Take 1 tablet by mouth Every 8 (Eight) Hours As Needed for Nausea or Vomiting., Disp: , Rfl:     potassium chloride (MICRO-K) 10 MEQ CR capsule, Take 1 capsule by mouth 2 (Two) Times a Day., Disp: , Rfl:     promethazine (PHENERGAN) 12.5 MG tablet, Take 1 tablet by mouth Every 6 (Six) Hours As Needed for Nausea or Vomiting., Disp: , Rfl:     No Known Allergies    Family History   Problem Relation Age of Onset    Prostate cancer Brother        Cancer-related family history includes Prostate cancer in her brother.      Social History     Tobacco Use    Smoking status: Never    Smokeless tobacco: Never   Vaping Use    Vaping Use: Never used   Substance Use Topics    Alcohol use: Never    Drug use: Never     Social History     Social History Narrative    Not on file       ROS:   Review of Systems   Constitutional: Negative.    HENT: Negative.     Eyes: Negative.    Respiratory: Negative.     Cardiovascular: Negative.    Gastrointestinal: Negative.    Endocrine: Negative.    Genitourinary: Negative.    Musculoskeletal: Negative.    Skin: Negative.    Allergic/Immunologic: Negative.    Neurological: Negative.    Hematological: Negative.    Psychiatric/Behavioral: Negative.           Objective:    Vital Signs:  Vitals:    12/27/23 0958   BP: 111/71   Pulse: 72   SpO2: 97%   Weight: 80.9 kg (178 lb 6.4 oz)   Height: 167.6 cm (66\")   PainSc: 0-No pain     Body mass index is 28.79 kg/m².    ECOG  (0) Fully active, able to carry on all predisease performance without restriction    Physical Exam:   Physical Exam  Constitutional:       Appearance: Normal appearance. She is normal weight.   HENT:      Head: Normocephalic and atraumatic.      Right Ear: External ear normal.      Left Ear: External ear normal.      " Nose: Nose normal.      Mouth/Throat:      Mouth: Mucous membranes are moist.      Pharynx: Oropharynx is clear.   Eyes:      Extraocular Movements: Extraocular movements intact.      Conjunctiva/sclera: Conjunctivae normal.      Pupils: Pupils are equal, round, and reactive to light.   Cardiovascular:      Rate and Rhythm: Normal rate and regular rhythm.      Pulses: Normal pulses.      Heart sounds: Normal heart sounds.   Pulmonary:      Effort: Pulmonary effort is normal.      Breath sounds: Normal breath sounds.   Abdominal:      General: Abdomen is flat. Bowel sounds are normal.      Palpations: Abdomen is soft.   Musculoskeletal:         General: Normal range of motion.      Cervical back: Normal range of motion and neck supple.   Skin:     General: Skin is warm.   Neurological:      Mental Status: She is alert.   Psychiatric:         Mood and Affect: Mood normal.         Behavior: Behavior normal.         Thought Content: Thought content normal.         Judgment: Judgment normal.         Lab Results - Last 18 Months   Lab Units 07/19/23  1157   WBC 10*3/mm3 7.40   HEMOGLOBIN g/dL 13.3   HEMATOCRIT % 40.7   PLATELETS 10*3/mm3 354   MCV fL 88.7       Lab Results - Last 18 Months   Lab Units 07/19/23  1157   INR  <0.93*   APTT seconds 23.5*       Lab Results   Component Value Date    PTT 23.5 (L) 07/19/2023    INR <0.93 (L) 07/19/2023         Assessment & Plan :      Stage III Rectal Adenocarcinoma: MSI-H  -Patient presented today for establishment of care, outside medical records, imaging findings, disease prognosis and treatment options were reviewed with the patient.  -Patient has completed Neoadjuvant Chemotherapy with FOLFOXIRI X 8 cycles with minimal adverse effects and no residual toxicities, her performance status is near baseline.  -Recent imaging on 10/27/23 as per outside records showed good treatment response overall.   -Patient is currently undergoing Neoadjuvant XRT with Concurrent Chmeotherapy  with 5FU weekly infusion.  -Will continue weekly 5FU at 275mg/m2 dose over 96 hour infusion (M-F)  -Will obtain repeat staging scans including MRI Pelvis following completion of therapy to assess ongoing treatment response.  -Continue to follow with Dr. Acosta (radiation Oncology) and Dr. Fairchild (CR[facility] at [facility]) for further management.       Follow up on 1/8/23 with start of chemotherapy cycle. Sooner as needed.      Thank you very much for providing the opportunity to participate in this patient’s care. Please do not hesitate to call if there are any other questions.

## 2023-12-26 NOTE — TELEPHONE ENCOUNTER
Caller: Xochitl Hanson    Relationship to patient: Self    Best call back number: [phone]    Chief complaint: R/S    Type of visit: LAB AND F/U    Requested date: 12-27 OR NEXT AVLIABLE     If rescheduling, when is the original appointment: 12-28    Additional notes: PLEASE CALL TO GO OVER SCHEDULING

## 2023-12-26 NOTE — PROGRESS NOTES
NAME: Xochitl Hanson DATE: 2023   : 1960    MRN: [id number] DIAGNOSIS:   Encounter Diagnosis   Name Primary?    Rectal cancer Yes          RADIATION ON TREATMENT VISIT NOTE - PELVIS    Treatment Summary    [x] Concurrent Chemo   Regimen: 5FU weekly ([facility])      Treatment Site Ref. ID Energy Dose/Fx (cGy) #Fx Dose Correction (cGy) Total Dose (cGy) Start Date End Date Elapsed Days   Pelvis init Pelvis 10X 180  0 1,980 2023 15       General     Review of Systems:  [] No new complaints  [] Nocturia  [] Slow urinary stream  [] Difficulty initiating urination [] Dysuria  [] Vaginal discharge  [] Increased frequency of urination [x] Soft/loose stool [] Diarrhea  [] Rectal burning   [] Skin soreness, location:   [x] Fatigue, severity: 1  [] Pain, severity: 0, location:   [] Bladder medication regimen:    [] Pain medication regimen:    [x] Bowel regimen:  Lomotil once every couple days, Imodium PRN  [] Skin regimen:      Subjective:  denies any rectal bleeding  No issues with diarrhea  Discussing her neuropathy with MEENA today.    KPS: 90     Vital Signs: /87   Pulse 86   Resp 18   Wt 79.4 kg (175 lb)   SpO2 98%   BMI 28.25 kg/m²     Weight:   Wt Readings from Last 3 Encounters:   23 79.4 kg (175 lb)   23 78.9 kg (174 lb)   23 78.7 kg (173 lb 9.6 oz)       Medication:   Current Outpatient Medications:     acetaminophen (TYLENOL) 650 MG 8 hr tablet, Take 1 tablet by mouth Every 8 (Eight) Hours As Needed., Disp: , Rfl:     cetirizine (zyrTEC) 10 MG tablet, Take 1 tablet by mouth Daily., Disp: , Rfl:     diphenoxylate-atropine (Lomotil) 2.5-0.025 MG per tablet, Take 1-2 tablets by mouth 4 (Four) Times a Day As Needed for Diarrhea., Disp: 120 tablet, Rfl: 1    DULoxetine (CYMBALTA) 20 MG capsule, Take 1 capsule by mouth Daily., Disp: , Rfl:     esomeprazole (nexIUM) 40 MG capsule, Take 1 capsule by mouth Every Morning Before Breakfast., Disp: , Rfl:      hydrOXYzine (ATARAX) 50 MG tablet, Take 1 tablet by mouth 3 (Three) Times a Day As Needed. (Patient not taking: Reported on 10/31/2023), Disp: , Rfl:     ibuprofen (ADVIL,MOTRIN) 200 MG tablet, Take 1 tablet by mouth Every 6 (Six) Hours As Needed., Disp: , Rfl:     lisinopril (PRINIVIL,ZESTRIL) 20 MG tablet, Take 1 tablet by mouth Daily., Disp: , Rfl:     LORazepam (ATIVAN) 1 MG tablet, Take 1 tablet by mouth Every 8 (Eight) Hours As Needed for Anxiety., Disp: , Rfl:     methocarbamol (ROBAXIN) 750 MG tablet, Take 1 tablet by mouth Every 8 (Eight) Hours As Needed., Disp: , Rfl:     multivitamin (THERAGRAN) tablet tablet, Take 1 tablet by mouth Daily., Disp: , Rfl:     ondansetron (ZOFRAN) 4 MG tablet, Take 1 tablet by mouth Every 8 (Eight) Hours As Needed for Nausea or Vomiting., Disp: , Rfl:     potassium chloride (MICRO-K) 10 MEQ CR capsule, Take 1 capsule by mouth 2 (Two) Times a Day., Disp: , Rfl:     promethazine (PHENERGAN) 12.5 MG tablet, Take 1 tablet by mouth Every 6 (Six) Hours As Needed for Nausea or Vomiting., Disp: , Rfl:      LABS (Reviewed):  Hematology  WBC   Date Value Ref Range Status   07/19/2023 7.40 3.40 - 10.80 10*3/mm3 Final     RBC   Date Value Ref Range Status   07/19/2023 4.59 3.77 - 5.28 10*6/mm3 Final     Hemoglobin   Date Value Ref Range Status   07/19/2023 13.3 12.0 - 15.9 g/dL Final     Hematocrit   Date Value Ref Range Status   07/19/2023 40.7 34.0 - 46.6 % Final     Platelets   Date Value Ref Range Status   07/19/2023 354 140 - 450 10*3/mm3 Final     Chemistry     LABS today at [facility]--requesting results    Physical Exam     Abdomen: [x] Soft          [x] Bowel sounds   GYN: [x] No Vaginal discharge   Extremities: [] Edema   GI: [x] Diarrhea--as noted, no issues and has supportive meds   [] Enteritis  [] Proctitis    [] Vomiting   Renal/Genitourinary: [] Cystitis     [] Bladder spasms  [] Incontinence   Skin: [x] stGstrstastdstest:st st1st Comments/Notes:      [x] Review of labs, images, dosimetry,  dose delivered, & treatment parameters.    Comments:     [x] Patient treatment setup reviewed.    Comments:     Recommendations:  continue XRT and supportive meds  Doing well    [x] Continue RT  [] Change RT Plan [] Hold RT, length:      Approved Electronically By:  Dave Acosta MD, 12/26/2023, 10:03 EST      Confidentiality of this medical record shall be maintained except when use or disclosure is required or permitted by law, regulation or written authorization by the patient.

## 2023-12-27 ENCOUNTER — CONSULT (OUTPATIENT)
Dept: ONCOLOGY | Facility: CLINIC | Age: 63
End: 2023-12-27
Payer: COMMERCIAL

## 2023-12-27 ENCOUNTER — HOSPITAL ENCOUNTER (OUTPATIENT)
Dept: RADIATION ONCOLOGY | Facility: HOSPITAL | Age: 63
Discharge: HOME | End: 2023-12-27

## 2023-12-27 VITALS
HEART RATE: 72 BPM | BODY MASS INDEX: 28.67 KG/M2 | WEIGHT: 178.4 LBS | SYSTOLIC BLOOD PRESSURE: 111 MMHG | HEIGHT: 66 IN | OXYGEN SATURATION: 97 % | DIASTOLIC BLOOD PRESSURE: 71 MMHG

## 2023-12-27 DIAGNOSIS — C20 RECTAL CANCER: Primary | ICD-10-CM

## 2023-12-27 LAB
RAD ONC ARIA COURSE ID: NORMAL
RAD ONC ARIA COURSE LAST TREATMENT DATE: NORMAL
RAD ONC ARIA COURSE START DATE: NORMAL
RAD ONC ARIA COURSE TREATMENT ELAPSED DAYS: 16
RAD ONC ARIA FIRST TREATMENT DATE: NORMAL
RAD ONC ARIA PLAN FRACTIONS TREATED TO DATE: 12
RAD ONC ARIA PLAN ID: NORMAL
RAD ONC ARIA PLAN PRESCRIBED DOSE PER FRACTION: 1.8 GY
RAD ONC ARIA PLAN PRIMARY REFERENCE POINT: NORMAL
RAD ONC ARIA PLAN TOTAL FRACTIONS PRESCRIBED: 25
RAD ONC ARIA PLAN TOTAL PRESCRIBED DOSE: 4500 CGY
RAD ONC ARIA REFERENCE POINT DOSAGE GIVEN TO DATE: 21.6 GY
RAD ONC ARIA REFERENCE POINT ID: NORMAL
RAD ONC ARIA REFERENCE POINT SESSION DOSAGE GIVEN: 1.8 GY

## 2023-12-27 PROCEDURE — 77014 CHG CT GUIDANCE RADIATION THERAPY FLDS PLACEMENT: CPT | Performed by: RADIOLOGY

## 2023-12-27 PROCEDURE — 77386: CPT | Performed by: RADIOLOGY

## 2023-12-27 RX ORDER — DULOXETINE 40 MG/1
1 CAPSULE, DELAYED RELEASE ORAL EVERY 24 HOURS
COMMUNITY
Start: 2023-10-10

## 2023-12-28 ENCOUNTER — HOSPITAL ENCOUNTER (OUTPATIENT)
Dept: RADIATION ONCOLOGY | Facility: HOSPITAL | Age: 63
Discharge: HOME | End: 2023-12-28

## 2023-12-28 DIAGNOSIS — C20 RECTAL CANCER: Primary | ICD-10-CM

## 2023-12-28 LAB
RAD ONC ARIA COURSE ID: NORMAL
RAD ONC ARIA COURSE LAST TREATMENT DATE: NORMAL
RAD ONC ARIA COURSE START DATE: NORMAL
RAD ONC ARIA COURSE TREATMENT ELAPSED DAYS: 17
RAD ONC ARIA FIRST TREATMENT DATE: NORMAL
RAD ONC ARIA PLAN FRACTIONS TREATED TO DATE: 13
RAD ONC ARIA PLAN ID: NORMAL
RAD ONC ARIA PLAN PRESCRIBED DOSE PER FRACTION: 1.8 GY
RAD ONC ARIA PLAN PRIMARY REFERENCE POINT: NORMAL
RAD ONC ARIA PLAN TOTAL FRACTIONS PRESCRIBED: 25
RAD ONC ARIA PLAN TOTAL PRESCRIBED DOSE: 4500 CGY
RAD ONC ARIA REFERENCE POINT DOSAGE GIVEN TO DATE: 23.4 GY
RAD ONC ARIA REFERENCE POINT ID: NORMAL
RAD ONC ARIA REFERENCE POINT SESSION DOSAGE GIVEN: 1.8 GY

## 2023-12-28 PROCEDURE — 77014 CHG CT GUIDANCE RADIATION THERAPY FLDS PLACEMENT: CPT | Performed by: RADIOLOGY

## 2023-12-28 PROCEDURE — 77386: CPT | Performed by: RADIOLOGY

## 2023-12-28 PROCEDURE — 77336 RADIATION PHYSICS CONSULT: CPT | Performed by: RADIOLOGY

## 2023-12-29 ENCOUNTER — HOSPITAL ENCOUNTER (OUTPATIENT)
Dept: RADIATION ONCOLOGY | Facility: HOSPITAL | Age: 63
Discharge: HOME | End: 2023-12-29

## 2023-12-29 ENCOUNTER — PATIENT ROUNDING (BHMG ONLY) (OUTPATIENT)
Dept: ONCOLOGY | Facility: CLINIC | Age: 63
End: 2023-12-29
Payer: COMMERCIAL

## 2023-12-29 LAB
RAD ONC ARIA COURSE ID: NORMAL
RAD ONC ARIA COURSE LAST TREATMENT DATE: NORMAL
RAD ONC ARIA COURSE START DATE: NORMAL
RAD ONC ARIA COURSE TREATMENT ELAPSED DAYS: 18
RAD ONC ARIA FIRST TREATMENT DATE: NORMAL
RAD ONC ARIA PLAN FRACTIONS TREATED TO DATE: 14
RAD ONC ARIA PLAN ID: NORMAL
RAD ONC ARIA PLAN PRESCRIBED DOSE PER FRACTION: 1.8 GY
RAD ONC ARIA PLAN PRIMARY REFERENCE POINT: NORMAL
RAD ONC ARIA PLAN TOTAL FRACTIONS PRESCRIBED: 25
RAD ONC ARIA PLAN TOTAL PRESCRIBED DOSE: 4500 CGY
RAD ONC ARIA REFERENCE POINT DOSAGE GIVEN TO DATE: 25.2 GY
RAD ONC ARIA REFERENCE POINT ID: NORMAL
RAD ONC ARIA REFERENCE POINT SESSION DOSAGE GIVEN: 1.8 GY

## 2023-12-29 PROCEDURE — 77014 CHG CT GUIDANCE RADIATION THERAPY FLDS PLACEMENT: CPT | Performed by: RADIOLOGY

## 2023-12-29 PROCEDURE — 77386: CPT | Performed by: RADIOLOGY

## 2023-12-29 NOTE — PROGRESS NOTES
NAME: Xochitl Hanson DATE: 2024   : 1960    MRN: [id number] DIAGNOSIS:   Encounter Diagnosis   Name Primary?    Rectal cancer Yes          RADIATION ON TREATMENT VISIT NOTE - PELVIS    Treatment Summary    [x] Concurrent Chemo   Regimen: 5FU over 96H ([facility])      Treatment Site Ref. ID Energy Dose/Fx (cGy) #Fx Dose Correction (cGy) Total Dose (cGy) Start Date End Date Elapsed Days   Pelvis init Pelvis 10X 180 15 / 25 0 2,700 2023 22       General     Review of Systems:  [] No new complaints  [] Nocturia  [] Slow urinary stream  [] Difficulty initiating urination [] Dysuria  [] Vaginal discharge  [] Increased frequency of urination [] Soft/loose stool [x] Diarrhea  [] Rectal burning   [] Skin soreness, location:   [x] Fatigue, severity: 1  [] Pain, severity: 0, location:   [] Bladder medication regimen:    [] Pain medication regimen:    [x] Bowel regimen:  GasX daily prior to XRT, Imodium/Lomotil PRN  [x] Skin regimen:  aquaphor    Subjective:  Increase in gas/bloating since last week. Takes GasX daily prior to treatment.    KPS: 90     Vital Signs:   Vitals:    24 1014   BP: 129/79   Pulse: 84   Resp: 18   SpO2: 97%         Weight:   Wt Readings from Last 3 Encounters:   24 78.9 kg (174 lb)   23 80.9 kg (178 lb 6.4 oz)   23 79.4 kg (175 lb)       Medication:   Current Outpatient Medications:     acetaminophen (TYLENOL) 650 MG 8 hr tablet, Take 1 tablet by mouth Every 8 (Eight) Hours As Needed., Disp: , Rfl:     cetirizine (zyrTEC) 10 MG tablet, Take 1 tablet by mouth Daily., Disp: , Rfl:     diphenoxylate-atropine (Lomotil) 2.5-0.025 MG per tablet, Take 1-2 tablets by mouth 4 (Four) Times a Day As Needed for Diarrhea., Disp: 120 tablet, Rfl: 1    DULoxetine (CYMBALTA) 20 MG capsule, Take 1 capsule by mouth Daily. (Patient not taking: Reported on 2023), Disp: , Rfl:     DULoxetine HCl 40 MG capsule delayed-release particles, 1 capsule Daily., Disp: , Rfl:      esomeprazole (nexIUM) 40 MG capsule, Take 1 capsule by mouth Every Morning Before Breakfast., Disp: , Rfl:     hydrOXYzine (ATARAX) 50 MG tablet, Take 1 tablet by mouth 3 (Three) Times a Day As Needed. (Patient not taking: Reported on 10/31/2023), Disp: , Rfl:     ibuprofen (ADVIL,MOTRIN) 200 MG tablet, Take 1 tablet by mouth Every 6 (Six) Hours As Needed., Disp: , Rfl:     lisinopril (PRINIVIL,ZESTRIL) 20 MG tablet, Take 1 tablet by mouth Daily., Disp: , Rfl:     LORazepam (ATIVAN) 1 MG tablet, Take 1 tablet by mouth Every 8 (Eight) Hours As Needed for Anxiety. (Patient not taking: Reported on 12/27/2023), Disp: , Rfl:     methocarbamol (ROBAXIN) 750 MG tablet, Take 1 tablet by mouth Every 8 (Eight) Hours As Needed., Disp: , Rfl:     multivitamin (THERAGRAN) tablet tablet, Take 1 tablet by mouth Daily., Disp: , Rfl:     ondansetron (ZOFRAN) 4 MG tablet, Take 1 tablet by mouth Every 8 (Eight) Hours As Needed for Nausea or Vomiting. (Patient not taking: Reported on 12/27/2023), Disp: , Rfl:     potassium chloride (MICRO-K) 10 MEQ CR capsule, Take 1 capsule by mouth 2 (Two) Times a Day., Disp: , Rfl:     promethazine (PHENERGAN) 12.5 MG tablet, Take 1 tablet by mouth Every 6 (Six) Hours As Needed for Nausea or Vomiting. (Patient not taking: Reported on 12/27/2023), Disp: , Rfl:      LABS (Reviewed):  Hematology  WBC   Date Value Ref Range Status   07/19/2023 7.40 3.40 - 10.80 10*3/mm3 Final     RBC   Date Value Ref Range Status   07/19/2023 4.59 3.77 - 5.28 10*6/mm3 Final     Hemoglobin   Date Value Ref Range Status   07/19/2023 13.3 12.0 - 15.9 g/dL Final     Hematocrit   Date Value Ref Range Status   07/19/2023 40.7 34.0 - 46.6 % Final     Platelets   Date Value Ref Range Status   07/19/2023 354 140 - 450 10*3/mm3 Final       Physical Exam     Abdomen: [x] Soft          [x] Bowel sounds   GYN: [] Vaginal discharge   Extremities: [] Edema   GI: [x] Diarrhea   [] Enteritis  [] Proctitis    [] Vomiting    Renal/Genitourinary: [] Cystitis     [] Bladder spasms  [] Incontinence   Skin: [x] Grade: grade1     Comments/Notes:      [x] Review of labs, images, dosimetry, dose delivered, & treatment parameters.    Comments:     [x] Patient treatment setup reviewed.    Comments:     Recommendations:  continue XRT and supportive measures      [x] Continue RT  [] Change RT Plan [] Hold RT, length:      Approved Electronically By:  Dave Acosta MD, 1/2/2024, 12:54 EST      Confidentiality of this medical record shall be maintained except when use or disclosure is required or permitted by law, regulation or written authorization by the patient.

## 2023-12-29 NOTE — PROGRESS NOTES
December 29, 2023    Hello, may I speak with Xochitl Hanson?    My name is Opal Beckman      I am  with [facility]  [address] [place] [address] [phone].    Before we get started may I verify your date of birth? 1960    I am calling to officially welcome you to our practice and ask about your recent visit. Is this a good time to talk? no    Tell me about your visit with us. What things went well?  A My Chart message was sent to the patient.        We're always looking for ways to make our patients' experiences even better. Do you have recommendations on ways we may improve?  no    Overall were you satisfied with your first visit to our practice? yes       I appreciate you taking the time to speak with me today. Is there anything else I can do for you? no      Thank you, and have a great day.

## 2024-01-02 ENCOUNTER — HOSPITAL ENCOUNTER (OUTPATIENT)
Dept: RADIATION ONCOLOGY | Facility: HOSPITAL | Age: 64
Setting detail: RADIATION/ONCOLOGY SERIES
End: 2024-01-02
Payer: COMMERCIAL

## 2024-01-02 ENCOUNTER — RADIATION ONCOLOGY WEEKLY ASSESSMENT (OUTPATIENT)
Dept: RADIATION ONCOLOGY | Facility: HOSPITAL | Age: 64
End: 2024-01-02
Payer: COMMERCIAL

## 2024-01-02 ENCOUNTER — HOSPITAL ENCOUNTER (OUTPATIENT)
Dept: RADIATION ONCOLOGY | Facility: HOSPITAL | Age: 64
Discharge: HOME | End: 2024-01-02

## 2024-01-02 VITALS
RESPIRATION RATE: 18 BRPM | BODY MASS INDEX: 28.08 KG/M2 | HEART RATE: 84 BPM | DIASTOLIC BLOOD PRESSURE: 79 MMHG | SYSTOLIC BLOOD PRESSURE: 129 MMHG | WEIGHT: 174 LBS | OXYGEN SATURATION: 97 %

## 2024-01-02 DIAGNOSIS — C20 RECTAL CANCER: Primary | ICD-10-CM

## 2024-01-02 LAB
RAD ONC ARIA COURSE ID: NORMAL
RAD ONC ARIA COURSE LAST TREATMENT DATE: NORMAL
RAD ONC ARIA COURSE START DATE: NORMAL
RAD ONC ARIA COURSE TREATMENT ELAPSED DAYS: 22
RAD ONC ARIA FIRST TREATMENT DATE: NORMAL
RAD ONC ARIA PLAN FRACTIONS TREATED TO DATE: 15
RAD ONC ARIA PLAN ID: NORMAL
RAD ONC ARIA PLAN PRESCRIBED DOSE PER FRACTION: 1.8 GY
RAD ONC ARIA PLAN PRIMARY REFERENCE POINT: NORMAL
RAD ONC ARIA PLAN TOTAL FRACTIONS PRESCRIBED: 25
RAD ONC ARIA PLAN TOTAL PRESCRIBED DOSE: 4500 CGY
RAD ONC ARIA REFERENCE POINT DOSAGE GIVEN TO DATE: 27 GY
RAD ONC ARIA REFERENCE POINT ID: NORMAL
RAD ONC ARIA REFERENCE POINT SESSION DOSAGE GIVEN: 1.8 GY

## 2024-01-02 PROCEDURE — 77386: CPT | Performed by: RADIOLOGY

## 2024-01-02 PROCEDURE — FACE2FACE: Performed by: RADIOLOGY

## 2024-01-02 PROCEDURE — 77014 CHG CT GUIDANCE RADIATION THERAPY FLDS PLACEMENT: CPT | Performed by: RADIOLOGY

## 2024-01-03 ENCOUNTER — HOSPITAL ENCOUNTER (OUTPATIENT)
Dept: RADIATION ONCOLOGY | Facility: HOSPITAL | Age: 64
Discharge: HOME | End: 2024-01-03

## 2024-01-03 LAB
RAD ONC ARIA COURSE ID: NORMAL
RAD ONC ARIA COURSE LAST TREATMENT DATE: NORMAL
RAD ONC ARIA COURSE START DATE: NORMAL
RAD ONC ARIA COURSE TREATMENT ELAPSED DAYS: 23
RAD ONC ARIA FIRST TREATMENT DATE: NORMAL
RAD ONC ARIA PLAN FRACTIONS TREATED TO DATE: 16
RAD ONC ARIA PLAN ID: NORMAL
RAD ONC ARIA PLAN PRESCRIBED DOSE PER FRACTION: 1.8 GY
RAD ONC ARIA PLAN PRIMARY REFERENCE POINT: NORMAL
RAD ONC ARIA PLAN TOTAL FRACTIONS PRESCRIBED: 25
RAD ONC ARIA PLAN TOTAL PRESCRIBED DOSE: 4500 CGY
RAD ONC ARIA REFERENCE POINT DOSAGE GIVEN TO DATE: 28.8 GY
RAD ONC ARIA REFERENCE POINT ID: NORMAL
RAD ONC ARIA REFERENCE POINT SESSION DOSAGE GIVEN: 1.8 GY

## 2024-01-03 PROCEDURE — 77014 CHG CT GUIDANCE RADIATION THERAPY FLDS PLACEMENT: CPT | Performed by: RADIOLOGY

## 2024-01-03 PROCEDURE — 77427 RADIATION TX MANAGEMENT X5: CPT | Performed by: RADIOLOGY

## 2024-01-03 PROCEDURE — 77386: CPT | Performed by: RADIOLOGY

## 2024-01-04 ENCOUNTER — HOSPITAL ENCOUNTER (OUTPATIENT)
Dept: RADIATION ONCOLOGY | Facility: HOSPITAL | Age: 64
Discharge: HOME | End: 2024-01-04

## 2024-01-04 LAB
RAD ONC ARIA COURSE ID: NORMAL
RAD ONC ARIA COURSE LAST TREATMENT DATE: NORMAL
RAD ONC ARIA COURSE START DATE: NORMAL
RAD ONC ARIA COURSE TREATMENT ELAPSED DAYS: 24
RAD ONC ARIA FIRST TREATMENT DATE: NORMAL
RAD ONC ARIA PLAN FRACTIONS TREATED TO DATE: 17
RAD ONC ARIA PLAN ID: NORMAL
RAD ONC ARIA PLAN PRESCRIBED DOSE PER FRACTION: 1.8 GY
RAD ONC ARIA PLAN PRIMARY REFERENCE POINT: NORMAL
RAD ONC ARIA PLAN TOTAL FRACTIONS PRESCRIBED: 25
RAD ONC ARIA PLAN TOTAL PRESCRIBED DOSE: 4500 CGY
RAD ONC ARIA REFERENCE POINT DOSAGE GIVEN TO DATE: 30.6 GY
RAD ONC ARIA REFERENCE POINT ID: NORMAL
RAD ONC ARIA REFERENCE POINT SESSION DOSAGE GIVEN: 1.8 GY

## 2024-01-04 PROCEDURE — 77336 RADIATION PHYSICS CONSULT: CPT | Performed by: RADIOLOGY

## 2024-01-04 PROCEDURE — 77386: CPT | Performed by: RADIOLOGY

## 2024-01-04 PROCEDURE — 77014 CHG CT GUIDANCE RADIATION THERAPY FLDS PLACEMENT: CPT | Performed by: RADIOLOGY

## 2024-01-04 NOTE — PROGRESS NOTES
HEMATOLOGY ONCOLOGY OUTPATIENT FOLLOW UP       Patient name: Xochitl Hanson  : 1960  MRN: [id number]  Primary Care Physician: Provider, No Known  Referring Physician: Provider, No Known  Reason For Consult:       History of Present Illness:  Patient is a 63 y.o. female with known diagnosis of stage III adenocarcinoma of the rectum who is presented today to establish her care.  She was previously following with Dr. Tamayo.  Her oncologic history is summarized as follows:    Patient had rectal bleeding for several months but amount had increased significantly During 2023. No symptoms of weight loss, rectal pain, or difficulty moving bowels. Previous colonoscopy completed 10 years ago ().     She underwent colonoscopy on 6/15/2023 with Dr. Horan at [facility]. Findings include a single sessile 3 mm polyp of benign appearance found in the cecum (polypectomy), and ulcerated mass with stigmata of recent bleeding of malignant appearance found in the rectum at the distance between 12 cm and 16 cm from the anus causing partial obstruction. Multiple cold forceps biopsies were performed. Pathology from the polypectomy revealed mucosal polyp with predominant benign lymphoid aggregate, negative for adenoma. Pathology from the rectal mass revealed invasive moderately differentiated adenocarcinoma with focal mucinous component. No loss of mucoid expression of MMR proteins and no evidence of deficient mismatch pair.  She was referred to Dr. Tamayo.     CT CAP on 2023 at [facility] showed no evidence of metastatic disease in the chest. Large hiatal hernia. Rectal mass with suggestion of local invasion of adjacent fat with several small but suspicious lymph nodes adjacent to the lesion. No evidence of distant metastatic disease to the liver or retroperitoneum.      She was seen in consult by Dr. Quintin Tamayo ([facility]) on 2023.  Planned to  obtain baseline labs including CBC, CMP, and CEA level   MRI of the rectum ordered to complete staging. She was referred to Dr. Fairchild for consideration of surgery.     She was seen in consult by Dr. Vannesa Fairchild ([facility]) on 6/27/2023. Rigid proctoscopy completed in the office. Tumor appeared to be in the upper rectum both on rigid proctoscopy and colonoscopy. If so, patient may be able to avoid XRT. Awaiting MRI and discussion at Multi-D Tumor Board. Discussed that if early-stage or upper rectal, may go straight to surgery versus neoadjuvant chemo.     MRI at [place]-- Not available presently.     [facility]-D Tumor Board on 7/5/2023 recommended neoadjuvant chemoradiation due to early T3a extension to muscularis propria and 5 mm lymph nodes noted on MRI. No EVMI.    7/27/23: Patient started Neoadjuvant Chemotherapy with FOLFOXIRI.  Cycle 1 was complicated by an infusion reaction to irinotecan which was managed with dexamethasone, Pepcid and Benadryl.  PEG filgrastim was added with cycle 2 FOLFOXIRI due to cytopenias.    7/27/2023: C1 D1 FOLFOXIRI  8/15/2023: C2 D1 FOLFOXIRI  10/9/2023: C6 D1 FOLFOXIRI  10/20/2023: Chemotherapy dose reduced by 20% due to thrombocytopenia.    10/27/2023: CT chest abdomen pelvis with contrast done at [facility] revealed patient's known rectal malignancy not well-visualized.  There was decreased surrounding perirectal fat stranding and no evidence of abdominopelvic metastatic disease.  CT chest was negative for any evidence of metastatic disease.    11/13/2023: C8-D1 FOLFOXIRI     11/30/2023: CT simulation for neoadjuvant radiation with concurrent 5-FU.    12/11/2023: Patient started on neoadjuvant radiation with weekly 5-FU infusion.     12/27/23: Patient presents for initial consultation today, She is undergoing neoadjuvant ChemoRT and is tolerating therapy relatively well. Denied any significant treatment related adverse effects presently, except for  fatigue. No weight/appetite changes, no Rectal bleeding. Cancer history as above.    1/8/23: patient resumed her concurrent Chem[facility]RT at [facility].    Subjective:  Patient seen today for follow up.She reported doing well overall, tolerated last chemo Cycle well. No Rectal symptoms with XRT. Again Reported having mild Neuropathy in fingertips, no motor weakness or LE Symptoms. ROS otherwise negative,    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    Rectal cancer 06/27/2023       Past Surgical History:   Procedure Laterality Date    ENDOMETRIAL ABLATION      TUBAL ABDOMINAL LIGATION           Current Outpatient Medications:     acetaminophen (TYLENOL) 650 MG 8 hr tablet, Take 1 tablet by mouth Every 8 (Eight) Hours As Needed., Disp: , Rfl:     cetirizine (zyrTEC) 10 MG tablet, Take 1 tablet by mouth Daily., Disp: , Rfl:     diphenoxylate-atropine (Lomotil) 2.5-0.025 MG per tablet, Take 1-2 tablets by mouth 4 (Four) Times a Day As Needed for Diarrhea., Disp: 120 tablet, Rfl: 1    DULoxetine (CYMBALTA) 20 MG capsule, Take 1 capsule by mouth Daily. (Patient not taking: Reported on 12/27/2023), Disp: , Rfl:     DULoxetine HCl 40 MG capsule delayed-release particles, 1 capsule Daily., Disp: , Rfl:     esomeprazole (nexIUM) 40 MG capsule, Take 1 capsule by mouth Every Morning Before Breakfast., Disp: , Rfl:     hydrOXYzine (ATARAX) 50 MG tablet, Take 1  tablet by mouth 3 (Three) Times a Day As Needed. (Patient not taking: Reported on 10/31/2023), Disp: , Rfl:     ibuprofen (ADVIL,MOTRIN) 200 MG tablet, Take 1 tablet by mouth Every 6 (Six) Hours As Needed., Disp: , Rfl:     lisinopril (PRINIVIL,ZESTRIL) 20 MG tablet, Take 1 tablet by mouth Daily., Disp: , Rfl:     LORazepam (ATIVAN) 1 MG tablet, Take 1 tablet by mouth Every 8 (Eight) Hours As Needed for Anxiety. (Patient not taking: Reported on 12/27/2023), Disp: , Rfl:     methocarbamol (ROBAXIN) 750 MG tablet, Take 1 tablet by mouth Every 8 (Eight) Hours As Needed., Disp: , Rfl:     multivitamin (THERAGRAN) tablet tablet, Take 1 tablet by mouth Daily., Disp: , Rfl:     ondansetron (ZOFRAN) 4 MG tablet, Take 1 tablet by mouth Every 8 (Eight) Hours As Needed for Nausea or Vomiting. (Patient not taking: Reported on 12/27/2023), Disp: , Rfl:     potassium chloride (MICRO-K) 10 MEQ CR capsule, Take 1 capsule by mouth 2 (Two) Times a Day., Disp: , Rfl:     promethazine (PHENERGAN) 12.5 MG tablet, Take 1 tablet by mouth Every 6 (Six) Hours As Needed for Nausea or Vomiting. (Patient not taking: Reported on 12/27/2023), Disp: , Rfl:     No Known Allergies    Family History   Problem Relation Age of Onset    Prostate cancer Brother        Cancer-related family history includes Prostate cancer in her brother.      Social History     Tobacco Use    Smoking status: Never    Smokeless tobacco: Never   Vaping Use    Vaping Use: Never used   Substance Use Topics    Alcohol use: Never    Drug use: Never     Social History     Social History Narrative    Not on file       ROS:   Review of Systems   Constitutional: Negative.    HENT: Negative.     Eyes: Negative.    Respiratory: Negative.     Cardiovascular: Negative.    Gastrointestinal: Negative.    Endocrine: Negative.    Genitourinary: Negative.    Musculoskeletal: Negative.    Skin: Negative.    Allergic/Immunologic: Negative.    Neurological: Negative.    Hematological:  "Negative.    Psychiatric/Behavioral: Negative.           Objective:    Vital Signs:  Vitals:    01/08/24 0843   BP: 140/82   Pulse: 93   Resp: 16   Temp: 97.9 °F (36.6 °C)   TempSrc: Oral   SpO2: 97%   Weight: 79.8 kg (176 lb)   Height: 167.6 cm (66\")   PainSc: 0-No pain       Body mass index is 28.41 kg/m².    ECOG  (0) Fully active, able to carry on all predisease performance without restriction    Physical Exam:   Physical Exam  Constitutional:       Appearance: Normal appearance. She is normal weight.   HENT:      Head: Normocephalic and atraumatic.      Right Ear: External ear normal.      Left Ear: External ear normal.      Nose: Nose normal.      Mouth/Throat:      Mouth: Mucous membranes are moist.      Pharynx: Oropharynx is clear.   Eyes:      Extraocular Movements: Extraocular movements intact.      Conjunctiva/sclera: Conjunctivae normal.      Pupils: Pupils are equal, round, and reactive to light.   Cardiovascular:      Rate and Rhythm: Normal rate and regular rhythm.      Pulses: Normal pulses.      Heart sounds: Normal heart sounds.   Pulmonary:      Effort: Pulmonary effort is normal.      Breath sounds: Normal breath sounds.   Abdominal:      General: Abdomen is flat. Bowel sounds are normal.      Palpations: Abdomen is soft.   Musculoskeletal:         General: Normal range of motion.      Cervical back: Normal range of motion and neck supple.   Skin:     General: Skin is warm.   Neurological:      Mental Status: She is alert.   Psychiatric:         Mood and Affect: Mood normal.         Behavior: Behavior normal.         Thought Content: Thought content normal.         Judgment: Judgment normal.         Lab Results - Last 18 Months   Lab Units 07/19/23  1157   WBC 10*3/mm3 7.40   HEMOGLOBIN g/dL 13.3   HEMATOCRIT % 40.7   PLATELETS 10*3/mm3 354   MCV fL 88.7       Lab Results - Last 18 Months   Lab Units 07/19/23  1157   INR  <0.93*   APTT seconds 23.5*       Lab Results   Component Value Date    " PTT 23.5 (L) 07/19/2023    INR <0.93 (L) 07/19/2023         Assessment & Plan :      Stage III Rectal Adenocarcinoma: MSI-H  -Patient presented today for establishment of care, outside medical records, imaging findings, disease prognosis and treatment options were reviewed with the patient.  -Patient has completed Neoadjuvant Chemotherapy with FOLFOXIRI X 8 cycles with minimal adverse effects and no residual toxicities, her performance status is near baseline.  -Recent imaging on 10/27/23 as per outside records showed good treatment response overall.   -Patient is currently undergoing Neoadjuvant XRT with Concurrent Chmeotherapy with 5FU infusion.  -to continue weekly 5FU at 275mg/m2 dose over 96 hour infusion (M-F)  -Patient appears to be tolerating the chemoradiation well so far.  -Will obtain repeat staging scans including MRI Pelvis following completion of therapy to assess ongoing treatment response.  -Continue to follow with Dr. Acosta (radiation Oncology) and Dr. Fairchild (CR[facility] at [facility]) for further management.    Peripheral neuropathy: Grade 1, Will continue to monitor. Patient wishes to use MVI supplement which is acceptable. Will Check B12 levels today.    Macrocytic anemia: Likely Sec to Chemotherapy. Will Check Iron panel, b12 and Folate today.    Follow up on 1/8/23 with start of chemotherapy cycle. Sooner as needed.      Thank you very much for providing the opportunity to participate in this patient’s care. Please do not hesitate to call if there are any other questions.

## 2024-01-05 ENCOUNTER — HOSPITAL ENCOUNTER (OUTPATIENT)
Dept: RADIATION ONCOLOGY | Facility: HOSPITAL | Age: 64
Discharge: HOME | End: 2024-01-05

## 2024-01-05 DIAGNOSIS — C20 RECTAL CANCER: Primary | ICD-10-CM

## 2024-01-05 LAB
RAD ONC ARIA COURSE ID: NORMAL
RAD ONC ARIA COURSE LAST TREATMENT DATE: NORMAL
RAD ONC ARIA COURSE START DATE: NORMAL
RAD ONC ARIA COURSE TREATMENT ELAPSED DAYS: 25
RAD ONC ARIA FIRST TREATMENT DATE: NORMAL
RAD ONC ARIA PLAN FRACTIONS TREATED TO DATE: 18
RAD ONC ARIA PLAN ID: NORMAL
RAD ONC ARIA PLAN PRESCRIBED DOSE PER FRACTION: 1.8 GY
RAD ONC ARIA PLAN PRIMARY REFERENCE POINT: NORMAL
RAD ONC ARIA PLAN TOTAL FRACTIONS PRESCRIBED: 25
RAD ONC ARIA PLAN TOTAL PRESCRIBED DOSE: 4500 CGY
RAD ONC ARIA REFERENCE POINT DOSAGE GIVEN TO DATE: 32.4 GY
RAD ONC ARIA REFERENCE POINT ID: NORMAL
RAD ONC ARIA REFERENCE POINT SESSION DOSAGE GIVEN: 1.8 GY

## 2024-01-05 PROCEDURE — 77386: CPT | Performed by: RADIOLOGY

## 2024-01-05 PROCEDURE — 77014 CHG CT GUIDANCE RADIATION THERAPY FLDS PLACEMENT: CPT | Performed by: RADIOLOGY

## 2024-01-08 ENCOUNTER — HOSPITAL ENCOUNTER (OUTPATIENT)
Dept: ONCOLOGY | Facility: HOSPITAL | Age: 64
Discharge: HOME | End: 2024-01-08
Admitting: STUDENT IN AN ORGANIZED HEALTH CARE EDUCATION/TRAINING PROGRAM
Payer: COMMERCIAL

## 2024-01-08 ENCOUNTER — HOSPITAL ENCOUNTER (OUTPATIENT)
Dept: RADIATION ONCOLOGY | Facility: HOSPITAL | Age: 64
Discharge: HOME | End: 2024-01-08
Payer: COMMERCIAL

## 2024-01-08 ENCOUNTER — OFFICE VISIT (OUTPATIENT)
Dept: ONCOLOGY | Facility: CLINIC | Age: 64
End: 2024-01-08
Payer: COMMERCIAL

## 2024-01-08 VITALS
DIASTOLIC BLOOD PRESSURE: 82 MMHG | TEMPERATURE: 97.9 F | HEIGHT: 66 IN | WEIGHT: 176.6 LBS | RESPIRATION RATE: 16 BRPM | OXYGEN SATURATION: 97 % | BODY MASS INDEX: 28.38 KG/M2 | SYSTOLIC BLOOD PRESSURE: 140 MMHG | HEART RATE: 93 BPM

## 2024-01-08 VITALS
WEIGHT: 176 LBS | HEIGHT: 66 IN | HEART RATE: 93 BPM | RESPIRATION RATE: 16 BRPM | SYSTOLIC BLOOD PRESSURE: 140 MMHG | TEMPERATURE: 97.9 F | DIASTOLIC BLOOD PRESSURE: 82 MMHG | BODY MASS INDEX: 28.28 KG/M2 | OXYGEN SATURATION: 97 %

## 2024-01-08 DIAGNOSIS — G62.9 NEUROPATHY: ICD-10-CM

## 2024-01-08 DIAGNOSIS — C20 RECTAL CANCER: Primary | ICD-10-CM

## 2024-01-08 PROBLEM — Z45.2 ENCOUNTER FOR CARE RELATED TO VASCULAR ACCESS PORT: Status: ACTIVE | Noted: 2024-01-08

## 2024-01-08 LAB
ALBUMIN SERPL-MCNC: 4.3 G/DL (ref 3.5–5.2)
ALBUMIN/GLOB SERPL: 1.9 G/DL
ALP SERPL-CCNC: 118 U/L (ref 39–117)
ALT SERPL W P-5'-P-CCNC: 15 U/L (ref 1–33)
ANION GAP SERPL CALCULATED.3IONS-SCNC: 13 MMOL/L (ref 5–15)
AST SERPL-CCNC: 18 U/L (ref 1–32)
BASOPHILS # BLD AUTO: 0.02 10*3/MM3 (ref 0–0.2)
BASOPHILS NFR BLD AUTO: 0.4 % (ref 0–1.5)
BILIRUB SERPL-MCNC: 0.5 MG/DL (ref 0–1.2)
BUN SERPL-MCNC: 16 MG/DL (ref 8–23)
BUN/CREAT SERPL: 22.5 (ref 7–25)
CALCIUM SPEC-SCNC: 9.3 MG/DL (ref 8.6–10.5)
CHLORIDE SERPL-SCNC: 106 MMOL/L (ref 98–107)
CO2 SERPL-SCNC: 22 MMOL/L (ref 22–29)
CREAT SERPL-MCNC: 0.71 MG/DL (ref 0.57–1)
DEPRECATED RDW RBC AUTO: 60.1 FL (ref 37–54)
EGFRCR SERPLBLD CKD-EPI 2021: 96.9 ML/MIN/1.73
EOSINOPHIL # BLD AUTO: 0.26 10*3/MM3 (ref 0–0.4)
EOSINOPHIL NFR BLD AUTO: 4.7 % (ref 0.3–6.2)
ERYTHROCYTE [DISTWIDTH] IN BLOOD BY AUTOMATED COUNT: 16.3 % (ref 12.3–15.4)
FERRITIN SERPL-MCNC: 53.5 NG/ML (ref 13–150)
FOLATE SERPL-MCNC: >20 NG/ML (ref 4.78–24.2)
GLOBULIN UR ELPH-MCNC: 2.3 GM/DL
GLUCOSE SERPL-MCNC: 112 MG/DL (ref 65–99)
HCT VFR BLD AUTO: 36.2 % (ref 34–46.6)
HGB BLD-MCNC: 11.5 G/DL (ref 12–15.9)
IRON 24H UR-MRATE: 67 MCG/DL (ref 37–145)
IRON SATN MFR SERPL: 14 % (ref 20–50)
LYMPHOCYTES # BLD AUTO: 0.64 10*3/MM3 (ref 0.7–3.1)
LYMPHOCYTES NFR BLD AUTO: 11.5 % (ref 19.6–45.3)
MCH RBC QN AUTO: 33 PG (ref 26.6–33)
MCHC RBC AUTO-ENTMCNC: 31.8 G/DL (ref 31.5–35.7)
MCV RBC AUTO: 103.7 FL (ref 79–97)
MONOCYTES # BLD AUTO: 0.52 10*3/MM3 (ref 0.1–0.9)
MONOCYTES NFR BLD AUTO: 9.4 % (ref 5–12)
NEUTROPHILS NFR BLD AUTO: 4.12 10*3/MM3 (ref 1.7–7)
NEUTROPHILS NFR BLD AUTO: 74 % (ref 42.7–76)
PLATELET # BLD AUTO: 259 10*3/MM3 (ref 140–450)
PMV BLD AUTO: 8.4 FL (ref 6–12)
POTASSIUM SERPL-SCNC: 4 MMOL/L (ref 3.5–5.2)
PROT SERPL-MCNC: 6.6 G/DL (ref 6–8.5)
RAD ONC ARIA COURSE ID: NORMAL
RAD ONC ARIA COURSE LAST TREATMENT DATE: NORMAL
RAD ONC ARIA COURSE START DATE: NORMAL
RAD ONC ARIA COURSE TREATMENT ELAPSED DAYS: 28
RAD ONC ARIA FIRST TREATMENT DATE: NORMAL
RAD ONC ARIA PLAN FRACTIONS TREATED TO DATE: 19
RAD ONC ARIA PLAN ID: NORMAL
RAD ONC ARIA PLAN PRESCRIBED DOSE PER FRACTION: 1.8 GY
RAD ONC ARIA PLAN PRIMARY REFERENCE POINT: NORMAL
RAD ONC ARIA PLAN TOTAL FRACTIONS PRESCRIBED: 25
RAD ONC ARIA PLAN TOTAL PRESCRIBED DOSE: 4500 CGY
RAD ONC ARIA REFERENCE POINT DOSAGE GIVEN TO DATE: 34.2 GY
RAD ONC ARIA REFERENCE POINT ID: NORMAL
RAD ONC ARIA REFERENCE POINT SESSION DOSAGE GIVEN: 1.8 GY
RBC # BLD AUTO: 3.49 10*6/MM3 (ref 3.77–5.28)
SODIUM SERPL-SCNC: 141 MMOL/L (ref 136–145)
TIBC SERPL-MCNC: 481 MCG/DL (ref 298–536)
TRANSFERRIN SERPL-MCNC: 323 MG/DL (ref 200–360)
VIT B12 BLD-MCNC: 328 PG/ML (ref 211–946)
WBC NRBC COR # BLD AUTO: 5.56 10*3/MM3 (ref 3.4–10.8)

## 2024-01-08 PROCEDURE — 99214 OFFICE O/P EST MOD 30 MIN: CPT | Performed by: STUDENT IN AN ORGANIZED HEALTH CARE EDUCATION/TRAINING PROGRAM

## 2024-01-08 PROCEDURE — 82728 ASSAY OF FERRITIN: CPT | Performed by: STUDENT IN AN ORGANIZED HEALTH CARE EDUCATION/TRAINING PROGRAM

## 2024-01-08 PROCEDURE — 83540 ASSAY OF IRON: CPT | Performed by: STUDENT IN AN ORGANIZED HEALTH CARE EDUCATION/TRAINING PROGRAM

## 2024-01-08 PROCEDURE — 84466 ASSAY OF TRANSFERRIN: CPT | Performed by: STUDENT IN AN ORGANIZED HEALTH CARE EDUCATION/TRAINING PROGRAM

## 2024-01-08 PROCEDURE — 82746 ASSAY OF FOLIC ACID SERUM: CPT | Performed by: STUDENT IN AN ORGANIZED HEALTH CARE EDUCATION/TRAINING PROGRAM

## 2024-01-08 PROCEDURE — 85025 COMPLETE CBC W/AUTO DIFF WBC: CPT | Performed by: STUDENT IN AN ORGANIZED HEALTH CARE EDUCATION/TRAINING PROGRAM

## 2024-01-08 PROCEDURE — 25810000003 SODIUM CHLORIDE 0.9 % SOLUTION 250 ML FLEX CONT: Performed by: STUDENT IN AN ORGANIZED HEALTH CARE EDUCATION/TRAINING PROGRAM

## 2024-01-08 PROCEDURE — 80053 COMPREHEN METABOLIC PANEL: CPT | Performed by: STUDENT IN AN ORGANIZED HEALTH CARE EDUCATION/TRAINING PROGRAM

## 2024-01-08 PROCEDURE — G0498 CHEMO EXTEND IV INFUS W/PUMP: HCPCS

## 2024-01-08 PROCEDURE — 82607 VITAMIN B-12: CPT | Performed by: STUDENT IN AN ORGANIZED HEALTH CARE EDUCATION/TRAINING PROGRAM

## 2024-01-08 PROCEDURE — 96416 CHEMO PROLONG INFUSE W/PUMP: CPT

## 2024-01-08 PROCEDURE — 77386: CPT | Performed by: RADIOLOGY

## 2024-01-08 PROCEDURE — 25010000002 FLUOROURACIL PER 500 MG: Performed by: STUDENT IN AN ORGANIZED HEALTH CARE EDUCATION/TRAINING PROGRAM

## 2024-01-08 PROCEDURE — 77014 CHG CT GUIDANCE RADIATION THERAPY FLDS PLACEMENT: CPT | Performed by: RADIOLOGY

## 2024-01-08 RX ORDER — HEPARIN SODIUM (PORCINE) LOCK FLUSH IV SOLN 100 UNIT/ML 100 UNIT/ML
500 SOLUTION INTRAVENOUS AS NEEDED
Status: CANCELLED | OUTPATIENT
Start: 2024-01-08

## 2024-01-08 RX ORDER — SODIUM CHLORIDE 0.9 % (FLUSH) 0.9 %
20 SYRINGE (ML) INJECTION AS NEEDED
Status: CANCELLED | OUTPATIENT
Start: 2024-01-08

## 2024-01-08 RX ADMIN — FLUOROURACIL 2100 MG: 50 INJECTION, SOLUTION INTRAVENOUS at 09:16

## 2024-01-08 NOTE — PROGRESS NOTES
Pt here for Dr. Darby f/u appt and chemotherapy.  Pt has been on tx at another provider office. Chemotherapy education completed and pt signed consent, given welcome folder, and chemotherapy spill kit with instructions for use given if needed.  Pt verbalized understanding.  Pt 5FU pump connected per MAR.  Pt sent to Dr. Darby clinic for f/u appt. Instructed to ask for AVS after new appointments made with understanding verbalized.

## 2024-01-09 ENCOUNTER — TELEPHONE (OUTPATIENT)
Dept: ONCOLOGY | Facility: CLINIC | Age: 64
End: 2024-01-09
Payer: COMMERCIAL

## 2024-01-09 NOTE — TELEPHONE ENCOUNTER
Caller: Xochitl Hanson    Relationship: Self    Best call back number: [phone]  Who are you requesting to speak with (clinical staff, provider,  specific staff member): scheduling    Do you know the name of the person who called: patient    What was the call regarding: pt would like to see if she can get her radiation and unhook closer together for the appts of 01/12 & 01/19

## 2024-01-09 NOTE — TELEPHONE ENCOUNTER
S/w ADALID, Diana LEMON to find out if Pt could come in early for her CIV DC. She said she probably need to come in later. S/w RAD downstairs, Nieves, to see if we could coordinate Pt appts on 1/12 and 1/19. Nieves stated Pt was coming for RAD tomorrow and she would adj w/Pt then

## 2024-01-10 ENCOUNTER — HOSPITAL ENCOUNTER (OUTPATIENT)
Dept: RADIATION ONCOLOGY | Facility: HOSPITAL | Age: 64
Discharge: HOME | End: 2024-01-10

## 2024-01-10 LAB
RAD ONC ARIA COURSE ID: NORMAL
RAD ONC ARIA COURSE LAST TREATMENT DATE: NORMAL
RAD ONC ARIA COURSE START DATE: NORMAL
RAD ONC ARIA COURSE TREATMENT ELAPSED DAYS: 30
RAD ONC ARIA FIRST TREATMENT DATE: NORMAL
RAD ONC ARIA PLAN FRACTIONS TREATED TO DATE: 20
RAD ONC ARIA PLAN ID: NORMAL
RAD ONC ARIA PLAN PRESCRIBED DOSE PER FRACTION: 1.8 GY
RAD ONC ARIA PLAN PRIMARY REFERENCE POINT: NORMAL
RAD ONC ARIA PLAN TOTAL FRACTIONS PRESCRIBED: 25
RAD ONC ARIA PLAN TOTAL PRESCRIBED DOSE: 4500 CGY
RAD ONC ARIA REFERENCE POINT DOSAGE GIVEN TO DATE: 36 GY
RAD ONC ARIA REFERENCE POINT ID: NORMAL
RAD ONC ARIA REFERENCE POINT SESSION DOSAGE GIVEN: 1.8 GY

## 2024-01-10 PROCEDURE — 77014 CHG CT GUIDANCE RADIATION THERAPY FLDS PLACEMENT: CPT | Performed by: RADIOLOGY

## 2024-01-10 PROCEDURE — 77386: CPT | Performed by: RADIOLOGY

## 2024-01-11 ENCOUNTER — RADIATION ONCOLOGY WEEKLY ASSESSMENT (OUTPATIENT)
Dept: RADIATION ONCOLOGY | Facility: HOSPITAL | Age: 64
End: 2024-01-11
Payer: COMMERCIAL

## 2024-01-11 ENCOUNTER — HOSPITAL ENCOUNTER (OUTPATIENT)
Dept: RADIATION ONCOLOGY | Facility: HOSPITAL | Age: 64
Discharge: HOME | End: 2024-01-11

## 2024-01-11 VITALS
DIASTOLIC BLOOD PRESSURE: 85 MMHG | WEIGHT: 175.8 LBS | SYSTOLIC BLOOD PRESSURE: 139 MMHG | RESPIRATION RATE: 18 BRPM | BODY MASS INDEX: 28.37 KG/M2 | HEART RATE: 87 BPM | OXYGEN SATURATION: 94 %

## 2024-01-11 DIAGNOSIS — C20 RECTAL CANCER: Primary | ICD-10-CM

## 2024-01-11 LAB
RAD ONC ARIA COURSE ID: NORMAL
RAD ONC ARIA COURSE LAST TREATMENT DATE: NORMAL
RAD ONC ARIA COURSE START DATE: NORMAL
RAD ONC ARIA COURSE TREATMENT ELAPSED DAYS: 31
RAD ONC ARIA FIRST TREATMENT DATE: NORMAL
RAD ONC ARIA PLAN FRACTIONS TREATED TO DATE: 21
RAD ONC ARIA PLAN ID: NORMAL
RAD ONC ARIA PLAN PRESCRIBED DOSE PER FRACTION: 1.8 GY
RAD ONC ARIA PLAN PRIMARY REFERENCE POINT: NORMAL
RAD ONC ARIA PLAN TOTAL FRACTIONS PRESCRIBED: 25
RAD ONC ARIA PLAN TOTAL PRESCRIBED DOSE: 4500 CGY
RAD ONC ARIA REFERENCE POINT DOSAGE GIVEN TO DATE: 37.8 GY
RAD ONC ARIA REFERENCE POINT ID: NORMAL
RAD ONC ARIA REFERENCE POINT SESSION DOSAGE GIVEN: 1.8 GY

## 2024-01-11 PROCEDURE — 77427 RADIATION TX MANAGEMENT X5: CPT | Performed by: RADIOLOGY

## 2024-01-11 PROCEDURE — 77386: CPT | Performed by: RADIOLOGY

## 2024-01-11 PROCEDURE — 77336 RADIATION PHYSICS CONSULT: CPT | Performed by: RADIOLOGY

## 2024-01-11 PROCEDURE — 77014 CHG CT GUIDANCE RADIATION THERAPY FLDS PLACEMENT: CPT | Performed by: RADIOLOGY

## 2024-01-11 PROCEDURE — FACE2FACE: Performed by: RADIOLOGY

## 2024-01-11 NOTE — PROGRESS NOTES
NAME: Xochitl Hanson DATE: 2024   : 1960    MRN: [id number] DIAGNOSIS: Rectal cancer       RADIATION ON TREATMENT VISIT NOTE - PELVIS    Treatment Summary    [x] Concurrent Chemo   Regimen:  Weekly 5FU (transferred care to [facility])      Treatment Site Ref. ID Energy Dose/Fx (cGy) #Fx Dose Correction (cGy) Total Dose (cGy) Start Date End Date Elapsed Days   Pelvis init Pelvis 10X 180  0 3,780 2023 31         General     Review of Systems:  [x] No new complaints  [] Nocturia  [] Slow urinary stream  [] Difficulty initiating urination [] Dysuria  [] Vaginal discharge  [] Increased frequency of urination [] Soft/loose stool [] Diarrhea  [] Rectal burning   [] Skin soreness, location:   [x] Fatigue, severity: 1  [] Pain, severity: 0, location:   [] Bladder medication regimen:    [] Pain medication regimen:    [] Bowel regimen:    [x] Skin regimen:  aquaphor    Subjective:  Neuropathy in fingers and toes. Told this would improve after systemic treatment completed. No skin complaints.    KPS: C90     Vital Signs: /85   Pulse 87   Resp 18   Wt 79.7 kg (175 lb 12.8 oz)   SpO2 94%   BMI 28.37 kg/m²     Weight:   Wt Readings from Last 3 Encounters:   24 79.7 kg (175 lb 12.8 oz)   24 80.1 kg (176 lb 9.6 oz)   24 79.8 kg (176 lb)       Medication:   Current Outpatient Medications:     acetaminophen (TYLENOL) 650 MG 8 hr tablet, Take 1 tablet by mouth Every 8 (Eight) Hours As Needed., Disp: , Rfl:     cetirizine (zyrTEC) 10 MG tablet, Take 1 tablet by mouth Daily., Disp: , Rfl:     diphenoxylate-atropine (Lomotil) 2.5-0.025 MG per tablet, Take 1-2 tablets by mouth 4 (Four) Times a Day As Needed for Diarrhea., Disp: 120 tablet, Rfl: 1    DULoxetine (CYMBALTA) 20 MG capsule, Take 1 capsule by mouth Daily. (Patient not taking: Reported on 2023), Disp: , Rfl:     DULoxetine HCl 40 MG capsule delayed-release particles, 1 capsule Daily., Disp: , Rfl:      esomeprazole (nexIUM) 40 MG capsule, Take 1 capsule by mouth Every Morning Before Breakfast., Disp: , Rfl:     hydrOXYzine (ATARAX) 50 MG tablet, Take 1 tablet by mouth 3 (Three) Times a Day As Needed. (Patient not taking: Reported on 10/31/2023), Disp: , Rfl:     ibuprofen (ADVIL,MOTRIN) 200 MG tablet, Take 1 tablet by mouth Every 6 (Six) Hours As Needed., Disp: , Rfl:     lisinopril (PRINIVIL,ZESTRIL) 20 MG tablet, Take 1 tablet by mouth Daily., Disp: , Rfl:     LORazepam (ATIVAN) 1 MG tablet, Take 1 tablet by mouth Every 8 (Eight) Hours As Needed for Anxiety. (Patient not taking: Reported on 12/27/2023), Disp: , Rfl:     methocarbamol (ROBAXIN) 750 MG tablet, Take 1 tablet by mouth Every 8 (Eight) Hours As Needed., Disp: , Rfl:     multivitamin (THERAGRAN) tablet tablet, Take 1 tablet by mouth Daily., Disp: , Rfl:     ondansetron (ZOFRAN) 4 MG tablet, Take 1 tablet by mouth Every 8 (Eight) Hours As Needed for Nausea or Vomiting. (Patient not taking: Reported on 12/27/2023), Disp: , Rfl:     potassium chloride (MICRO-K) 10 MEQ CR capsule, Take 1 capsule by mouth 2 (Two) Times a Day., Disp: , Rfl:     promethazine (PHENERGAN) 12.5 MG tablet, Take 1 tablet by mouth Every 6 (Six) Hours As Needed for Nausea or Vomiting. (Patient not taking: Reported on 12/27/2023), Disp: , Rfl:      LABS (Reviewed):  Hematology  WBC   Date Value Ref Range Status   01/08/2024 5.56 3.40 - 10.80 10*3/mm3 Final     RBC   Date Value Ref Range Status   01/08/2024 3.49 (L) 3.77 - 5.28 10*6/mm3 Final     Hemoglobin   Date Value Ref Range Status   01/08/2024 11.5 (L) 12.0 - 15.9 g/dL Final     Hematocrit   Date Value Ref Range Status   01/08/2024 36.2 34.0 - 46.6 % Final     Platelets   Date Value Ref Range Status   01/08/2024 259 140 - 450 10*3/mm3 Final     Chemistry   Lab Results   Component Value Date    GLUCOSE 112 (H) 01/08/2024    BUN 16 01/08/2024    CREATININE 0.71 01/08/2024    BCR 22.5 01/08/2024    K 4.0 01/08/2024    CO2 22.0  01/08/2024    CALCIUM 9.3 01/08/2024    ALBUMIN 4.3 01/08/2024    AST 18 01/08/2024    ALT 15 01/08/2024       Physical Exam     Abdomen: [x] Soft          [x] Bowel sounds   GYN: [] Vaginal discharge   Extremities: [] Edema   GI: [x] Diarrhea   [] Enteritis  [] Proctitis    [] Vomiting   Renal/Genitourinary: [] Cystitis     [] Bladder spasms  [] Incontinence   Skin: [x] Grade: 0-1     Comments/Notes:      [x] Review of labs, images, dosimetry, dose delivered, & treatment parameters.    Comments:     [x] Patient treatment setup reviewed.    Comments:     Recommendations:  continue XRT and supportive measures    [x] Continue RT  [] Change RT Plan [] Hold RT, length:      Approved Electronically By:  Dave Acosta MD, 1/11/2024, 07:52 EST      Confidentiality of this medical record shall be maintained except when use or disclosure is required or permitted by law, regulation or written authorization by the patient.

## 2024-01-12 ENCOUNTER — HOSPITAL ENCOUNTER (OUTPATIENT)
Dept: ONCOLOGY | Facility: HOSPITAL | Age: 64
Discharge: HOME | End: 2024-01-12
Payer: COMMERCIAL

## 2024-01-12 ENCOUNTER — HOSPITAL ENCOUNTER (OUTPATIENT)
Dept: RADIATION ONCOLOGY | Facility: HOSPITAL | Age: 64
Discharge: HOME | End: 2024-01-12

## 2024-01-12 DIAGNOSIS — C20 RECTAL CANCER: Primary | ICD-10-CM

## 2024-01-12 DIAGNOSIS — Z45.2 ENCOUNTER FOR CARE RELATED TO VASCULAR ACCESS PORT: ICD-10-CM

## 2024-01-12 LAB
RAD ONC ARIA COURSE ID: NORMAL
RAD ONC ARIA COURSE LAST TREATMENT DATE: NORMAL
RAD ONC ARIA COURSE START DATE: NORMAL
RAD ONC ARIA COURSE TREATMENT ELAPSED DAYS: 32
RAD ONC ARIA FIRST TREATMENT DATE: NORMAL
RAD ONC ARIA PLAN FRACTIONS TREATED TO DATE: 22
RAD ONC ARIA PLAN ID: NORMAL
RAD ONC ARIA PLAN PRESCRIBED DOSE PER FRACTION: 1.8 GY
RAD ONC ARIA PLAN PRIMARY REFERENCE POINT: NORMAL
RAD ONC ARIA PLAN TOTAL FRACTIONS PRESCRIBED: 25
RAD ONC ARIA PLAN TOTAL PRESCRIBED DOSE: 4500 CGY
RAD ONC ARIA REFERENCE POINT DOSAGE GIVEN TO DATE: 39.6 GY
RAD ONC ARIA REFERENCE POINT ID: NORMAL
RAD ONC ARIA REFERENCE POINT SESSION DOSAGE GIVEN: 1.8 GY

## 2024-01-12 PROCEDURE — 77386: CPT | Performed by: RADIOLOGY

## 2024-01-12 PROCEDURE — 77014 CHG CT GUIDANCE RADIATION THERAPY FLDS PLACEMENT: CPT | Performed by: RADIOLOGY

## 2024-01-12 PROCEDURE — 25010000002 HEPARIN LOCK FLUSH PER 10 UNITS: Performed by: STUDENT IN AN ORGANIZED HEALTH CARE EDUCATION/TRAINING PROGRAM

## 2024-01-12 RX ORDER — HEPARIN SODIUM (PORCINE) LOCK FLUSH IV SOLN 100 UNIT/ML 100 UNIT/ML
500 SOLUTION INTRAVENOUS AS NEEDED
OUTPATIENT
Start: 2024-01-12

## 2024-01-12 RX ORDER — HEPARIN SODIUM (PORCINE) LOCK FLUSH IV SOLN 100 UNIT/ML 100 UNIT/ML
500 SOLUTION INTRAVENOUS AS NEEDED
Status: DISCONTINUED | OUTPATIENT
Start: 2024-01-12 | End: 2024-01-13 | Stop reason: HOSPADM

## 2024-01-12 RX ORDER — SODIUM CHLORIDE 0.9 % (FLUSH) 0.9 %
20 SYRINGE (ML) INJECTION AS NEEDED
OUTPATIENT
Start: 2024-01-12

## 2024-01-12 RX ORDER — SODIUM CHLORIDE 0.9 % (FLUSH) 0.9 %
20 SYRINGE (ML) INJECTION AS NEEDED
Status: DISCONTINUED | OUTPATIENT
Start: 2024-01-12 | End: 2024-01-13 | Stop reason: HOSPADM

## 2024-01-12 RX ADMIN — Medication 20 ML: at 14:52

## 2024-01-12 RX ADMIN — HEPARIN 500 UNITS: 100 SYRINGE at 14:52

## 2024-01-12 NOTE — PROGRESS NOTES
Pt here for chemo pump d/c. 5FU pump empty. Port aspirated, positive blood return noted. Port flushed with 10 ml NS and heparin 500 units, then de-accessed. Pt tolerated well.

## 2024-01-15 ENCOUNTER — HOSPITAL ENCOUNTER (OUTPATIENT)
Dept: ONCOLOGY | Facility: HOSPITAL | Age: 64
Discharge: HOME | End: 2024-01-15
Admitting: STUDENT IN AN ORGANIZED HEALTH CARE EDUCATION/TRAINING PROGRAM
Payer: COMMERCIAL

## 2024-01-15 ENCOUNTER — HOSPITAL ENCOUNTER (OUTPATIENT)
Dept: RADIATION ONCOLOGY | Facility: HOSPITAL | Age: 64
Discharge: HOME | End: 2024-01-15
Payer: COMMERCIAL

## 2024-01-15 VITALS
HEIGHT: 66 IN | OXYGEN SATURATION: 93 % | WEIGHT: 176.5 LBS | TEMPERATURE: 98 F | HEART RATE: 100 BPM | SYSTOLIC BLOOD PRESSURE: 153 MMHG | RESPIRATION RATE: 16 BRPM | BODY MASS INDEX: 28.37 KG/M2 | DIASTOLIC BLOOD PRESSURE: 74 MMHG

## 2024-01-15 DIAGNOSIS — C20 RECTAL CANCER: Primary | ICD-10-CM

## 2024-01-15 LAB
ALBUMIN SERPL-MCNC: 4.1 G/DL (ref 3.5–5.2)
ALBUMIN/GLOB SERPL: 1.7 G/DL
ALP SERPL-CCNC: 114 U/L (ref 39–117)
ALT SERPL W P-5'-P-CCNC: 21 U/L (ref 1–33)
ANION GAP SERPL CALCULATED.3IONS-SCNC: 11 MMOL/L (ref 5–15)
AST SERPL-CCNC: 24 U/L (ref 1–32)
BASOPHILS # BLD AUTO: 0.02 10*3/MM3 (ref 0–0.2)
BASOPHILS NFR BLD AUTO: 0.3 % (ref 0–1.5)
BILIRUB SERPL-MCNC: 0.6 MG/DL (ref 0–1.2)
BUN SERPL-MCNC: 16 MG/DL (ref 8–23)
BUN/CREAT SERPL: 22.9 (ref 7–25)
CALCIUM SPEC-SCNC: 9.1 MG/DL (ref 8.6–10.5)
CHLORIDE SERPL-SCNC: 105 MMOL/L (ref 98–107)
CO2 SERPL-SCNC: 22 MMOL/L (ref 22–29)
CREAT SERPL-MCNC: 0.7 MG/DL (ref 0.57–1)
DEPRECATED RDW RBC AUTO: 59.1 FL (ref 37–54)
EGFRCR SERPLBLD CKD-EPI 2021: 98.5 ML/MIN/1.73
EOSINOPHIL # BLD AUTO: 0.26 10*3/MM3 (ref 0–0.4)
EOSINOPHIL NFR BLD AUTO: 4.2 % (ref 0.3–6.2)
ERYTHROCYTE [DISTWIDTH] IN BLOOD BY AUTOMATED COUNT: 16.4 % (ref 12.3–15.4)
GLOBULIN UR ELPH-MCNC: 2.4 GM/DL
GLUCOSE SERPL-MCNC: 107 MG/DL (ref 65–99)
HCT VFR BLD AUTO: 35 % (ref 34–46.6)
HGB BLD-MCNC: 11.2 G/DL (ref 12–15.9)
LYMPHOCYTES # BLD AUTO: 0.63 10*3/MM3 (ref 0.7–3.1)
LYMPHOCYTES NFR BLD AUTO: 10.1 % (ref 19.6–45.3)
MCH RBC QN AUTO: 32.8 PG (ref 26.6–33)
MCHC RBC AUTO-ENTMCNC: 32 G/DL (ref 31.5–35.7)
MCV RBC AUTO: 102.6 FL (ref 79–97)
MONOCYTES # BLD AUTO: 0.65 10*3/MM3 (ref 0.1–0.9)
MONOCYTES NFR BLD AUTO: 10.4 % (ref 5–12)
NEUTROPHILS NFR BLD AUTO: 4.7 10*3/MM3 (ref 1.7–7)
NEUTROPHILS NFR BLD AUTO: 75 % (ref 42.7–76)
PLATELET # BLD AUTO: 271 10*3/MM3 (ref 140–450)
PMV BLD AUTO: 8.3 FL (ref 6–12)
POTASSIUM SERPL-SCNC: 4.3 MMOL/L (ref 3.5–5.2)
PROT SERPL-MCNC: 6.5 G/DL (ref 6–8.5)
RAD ONC ARIA COURSE ID: NORMAL
RAD ONC ARIA COURSE LAST TREATMENT DATE: NORMAL
RAD ONC ARIA COURSE START DATE: NORMAL
RAD ONC ARIA COURSE TREATMENT ELAPSED DAYS: 35
RAD ONC ARIA FIRST TREATMENT DATE: NORMAL
RAD ONC ARIA PLAN FRACTIONS TREATED TO DATE: 23
RAD ONC ARIA PLAN ID: NORMAL
RAD ONC ARIA PLAN PRESCRIBED DOSE PER FRACTION: 1.8 GY
RAD ONC ARIA PLAN PRIMARY REFERENCE POINT: NORMAL
RAD ONC ARIA PLAN TOTAL FRACTIONS PRESCRIBED: 25
RAD ONC ARIA PLAN TOTAL PRESCRIBED DOSE: 4500 CGY
RAD ONC ARIA REFERENCE POINT DOSAGE GIVEN TO DATE: 41.4 GY
RAD ONC ARIA REFERENCE POINT ID: NORMAL
RAD ONC ARIA REFERENCE POINT SESSION DOSAGE GIVEN: 1.8 GY
RBC # BLD AUTO: 3.41 10*6/MM3 (ref 3.77–5.28)
SODIUM SERPL-SCNC: 138 MMOL/L (ref 136–145)
WBC NRBC COR # BLD AUTO: 6.26 10*3/MM3 (ref 3.4–10.8)

## 2024-01-15 PROCEDURE — 36591 DRAW BLOOD OFF VENOUS DEVICE: CPT

## 2024-01-15 PROCEDURE — 85025 COMPLETE CBC W/AUTO DIFF WBC: CPT | Performed by: STUDENT IN AN ORGANIZED HEALTH CARE EDUCATION/TRAINING PROGRAM

## 2024-01-15 PROCEDURE — 96416 CHEMO PROLONG INFUSE W/PUMP: CPT

## 2024-01-15 PROCEDURE — G0498 CHEMO EXTEND IV INFUS W/PUMP: HCPCS

## 2024-01-15 PROCEDURE — 77386: CPT | Performed by: RADIOLOGY

## 2024-01-15 PROCEDURE — 25010000002 FLUOROURACIL PER 500 MG: Performed by: STUDENT IN AN ORGANIZED HEALTH CARE EDUCATION/TRAINING PROGRAM

## 2024-01-15 PROCEDURE — 80053 COMPREHEN METABOLIC PANEL: CPT | Performed by: STUDENT IN AN ORGANIZED HEALTH CARE EDUCATION/TRAINING PROGRAM

## 2024-01-15 PROCEDURE — 25810000003 SODIUM CHLORIDE 0.9 % SOLUTION 500 ML FLEX CONT: Performed by: STUDENT IN AN ORGANIZED HEALTH CARE EDUCATION/TRAINING PROGRAM

## 2024-01-15 PROCEDURE — 77014 CHG CT GUIDANCE RADIATION THERAPY FLDS PLACEMENT: CPT | Performed by: RADIOLOGY

## 2024-01-15 RX ADMIN — FLUOROURACIL 2100 MG: 50 INJECTION, SOLUTION INTRAVENOUS at 08:57

## 2024-01-16 ENCOUNTER — HOSPITAL ENCOUNTER (OUTPATIENT)
Dept: RADIATION ONCOLOGY | Facility: HOSPITAL | Age: 64
Discharge: HOME | End: 2024-01-16

## 2024-01-16 LAB
RAD ONC ARIA COURSE ID: NORMAL
RAD ONC ARIA COURSE LAST TREATMENT DATE: NORMAL
RAD ONC ARIA COURSE START DATE: NORMAL
RAD ONC ARIA COURSE TREATMENT ELAPSED DAYS: 36
RAD ONC ARIA FIRST TREATMENT DATE: NORMAL
RAD ONC ARIA PLAN FRACTIONS TREATED TO DATE: 24
RAD ONC ARIA PLAN ID: NORMAL
RAD ONC ARIA PLAN PRESCRIBED DOSE PER FRACTION: 1.8 GY
RAD ONC ARIA PLAN PRIMARY REFERENCE POINT: NORMAL
RAD ONC ARIA PLAN TOTAL FRACTIONS PRESCRIBED: 25
RAD ONC ARIA PLAN TOTAL PRESCRIBED DOSE: 4500 CGY
RAD ONC ARIA REFERENCE POINT DOSAGE GIVEN TO DATE: 43.2 GY
RAD ONC ARIA REFERENCE POINT ID: NORMAL
RAD ONC ARIA REFERENCE POINT SESSION DOSAGE GIVEN: 1.8 GY

## 2024-01-16 PROCEDURE — 77300 RADIATION THERAPY DOSE PLAN: CPT | Performed by: RADIOLOGY

## 2024-01-16 PROCEDURE — 77338 DESIGN MLC DEVICE FOR IMRT: CPT | Performed by: RADIOLOGY

## 2024-01-16 PROCEDURE — 77386: CPT | Performed by: RADIOLOGY

## 2024-01-17 ENCOUNTER — HOSPITAL ENCOUNTER (OUTPATIENT)
Dept: RADIATION ONCOLOGY | Facility: HOSPITAL | Age: 64
Discharge: HOME | End: 2024-01-17

## 2024-01-17 ENCOUNTER — RADIATION ONCOLOGY WEEKLY ASSESSMENT (OUTPATIENT)
Dept: RADIATION ONCOLOGY | Facility: HOSPITAL | Age: 64
End: 2024-01-17
Payer: COMMERCIAL

## 2024-01-17 VITALS
RESPIRATION RATE: 20 BRPM | WEIGHT: 175.4 LBS | OXYGEN SATURATION: 98 % | SYSTOLIC BLOOD PRESSURE: 134 MMHG | DIASTOLIC BLOOD PRESSURE: 83 MMHG | BODY MASS INDEX: 28.31 KG/M2 | HEART RATE: 87 BPM

## 2024-01-17 DIAGNOSIS — C20 RECTAL CANCER: Primary | ICD-10-CM

## 2024-01-17 LAB
RAD ONC ARIA COURSE ID: NORMAL
RAD ONC ARIA COURSE LAST TREATMENT DATE: NORMAL
RAD ONC ARIA COURSE START DATE: NORMAL
RAD ONC ARIA COURSE TREATMENT ELAPSED DAYS: 37
RAD ONC ARIA FIRST TREATMENT DATE: NORMAL
RAD ONC ARIA PLAN FRACTIONS TREATED TO DATE: 25
RAD ONC ARIA PLAN ID: NORMAL
RAD ONC ARIA PLAN PRESCRIBED DOSE PER FRACTION: 1.8 GY
RAD ONC ARIA PLAN PRIMARY REFERENCE POINT: NORMAL
RAD ONC ARIA PLAN TOTAL FRACTIONS PRESCRIBED: 25
RAD ONC ARIA PLAN TOTAL PRESCRIBED DOSE: 4500 CGY
RAD ONC ARIA REFERENCE POINT DOSAGE GIVEN TO DATE: 45 GY
RAD ONC ARIA REFERENCE POINT ID: NORMAL
RAD ONC ARIA REFERENCE POINT SESSION DOSAGE GIVEN: 1.8 GY

## 2024-01-17 PROCEDURE — FACE2FACE: Performed by: RADIOLOGY

## 2024-01-17 PROCEDURE — 77014 CHG CT GUIDANCE RADIATION THERAPY FLDS PLACEMENT: CPT | Performed by: RADIOLOGY

## 2024-01-17 PROCEDURE — 77386: CPT | Performed by: RADIOLOGY

## 2024-01-17 NOTE — PROGRESS NOTES
"NAME: Xochitl Hanson DATE: 2024   : 1960    MRN: [id number] DIAGNOSIS:   Encounter Diagnosis   Name Primary?    Rectal cancer Yes          RADIATION ON TREATMENT VISIT NOTE - PELVIS    Treatment Summary    [x] Concurrent Chemo   Regimen:  5FU ([facility])      Treatment Site Ref. ID Energy Dose/Fx (cGy) #Fx Dose Correction (cGy) Total Dose (cGy) Start Date End Date Elapsed Days   Pelvis init Pelvis 10X 180  /  0 4,500 2023 37     Starts 3 fraction boost    General     Review of Systems:  [] No new complaints  [] Nocturia  [] Slow urinary stream  [] Difficulty initiating urination [] Dysuria  [] Vaginal discharge  [] Increased frequency of urination [x] Soft/loose stool [] Diarrhea  [] Rectal burning   [] Skin soreness, location:   [x] Fatigue, severity: 1  [] Pain, severity: 0, location:   [] Bladder medication regimen:    [] Pain medication regimen:    [] Bowel regimen:    [x] Skin regimen:  Aquaphor to rectum and groin folds at least BID    Subjective:  Bowel \"urgency\" with little output (soft/loose); also has a lot of flatus.  Hasn't been using Imodium as much--discussed vs. Anusol HC--wants to try imodium for 2 days    KPS: 90     Vital Signs: /83   Pulse 87   Resp 20   Wt 79.6 kg (175 lb 6.4 oz)   SpO2 98%   BMI 28.31 kg/m²     Weight:   Wt Readings from Last 3 Encounters:   24 79.6 kg (175 lb 6.4 oz)   01/15/24 80.1 kg (176 lb 8 oz)   24 79.7 kg (175 lb 12.8 oz)       Medication:   Current Outpatient Medications:     acetaminophen (TYLENOL) 650 MG 8 hr tablet, Take 1 tablet by mouth Every 8 (Eight) Hours As Needed., Disp: , Rfl:     cetirizine (zyrTEC) 10 MG tablet, Take 1 tablet by mouth Daily., Disp: , Rfl:     diphenoxylate-atropine (Lomotil) 2.5-0.025 MG per tablet, Take 1-2 tablets by mouth 4 (Four) Times a Day As Needed for Diarrhea., Disp: 120 tablet, Rfl: 1    DULoxetine (CYMBALTA) 20 MG capsule, Take 1 capsule by mouth Daily. (Patient not taking: " Reported on 12/27/2023), Disp: , Rfl:     DULoxetine HCl 40 MG capsule delayed-release particles, 1 capsule Daily., Disp: , Rfl:     esomeprazole (nexIUM) 40 MG capsule, Take 1 capsule by mouth Every Morning Before Breakfast., Disp: , Rfl:     hydrOXYzine (ATARAX) 50 MG tablet, Take 1 tablet by mouth 3 (Three) Times a Day As Needed. (Patient not taking: Reported on 10/31/2023), Disp: , Rfl:     ibuprofen (ADVIL,MOTRIN) 200 MG tablet, Take 1 tablet by mouth Every 6 (Six) Hours As Needed., Disp: , Rfl:     lisinopril (PRINIVIL,ZESTRIL) 20 MG tablet, Take 1 tablet by mouth Daily., Disp: , Rfl:     LORazepam (ATIVAN) 1 MG tablet, Take 1 tablet by mouth Every 8 (Eight) Hours As Needed for Anxiety. (Patient not taking: Reported on 12/27/2023), Disp: , Rfl:     methocarbamol (ROBAXIN) 750 MG tablet, Take 1 tablet by mouth Every 8 (Eight) Hours As Needed., Disp: , Rfl:     multivitamin (THERAGRAN) tablet tablet, Take 1 tablet by mouth Daily., Disp: , Rfl:     ondansetron (ZOFRAN) 4 MG tablet, Take 1 tablet by mouth Every 8 (Eight) Hours As Needed for Nausea or Vomiting. (Patient not taking: Reported on 12/27/2023), Disp: , Rfl:     potassium chloride (MICRO-K) 10 MEQ CR capsule, Take 1 capsule by mouth 2 (Two) Times a Day., Disp: , Rfl:     promethazine (PHENERGAN) 12.5 MG tablet, Take 1 tablet by mouth Every 6 (Six) Hours As Needed for Nausea or Vomiting. (Patient not taking: Reported on 12/27/2023), Disp: , Rfl:      LABS (Reviewed):  Hematology  WBC   Date Value Ref Range Status   01/15/2024 6.26 3.40 - 10.80 10*3/mm3 Final     RBC   Date Value Ref Range Status   01/15/2024 3.41 (L) 3.77 - 5.28 10*6/mm3 Final     Hemoglobin   Date Value Ref Range Status   01/15/2024 11.2 (L) 12.0 - 15.9 g/dL Final     Hematocrit   Date Value Ref Range Status   01/15/2024 35.0 34.0 - 46.6 % Final     Platelets   Date Value Ref Range Status   01/15/2024 271 140 - 450 10*3/mm3 Final     Chemistry   Lab Results   Component Value Date     GLUCOSE 107 (H) 01/15/2024    BUN 16 01/15/2024    CREATININE 0.70 01/15/2024    BCR 22.9 01/15/2024    K 4.3 01/15/2024    CO2 22.0 01/15/2024    CALCIUM 9.1 01/15/2024    ALBUMIN 4.1 01/15/2024    AST 24 01/15/2024    ALT 21 01/15/2024       Physical Exam     Abdomen: [x] Soft          [x] Bowel sounds   GYN: [] Vaginal discharge   Extremities: [] Edema   GI: [x] Diarrhea   [] Enteritis  [] Proctitis    [] Vomiting   Renal/Genitourinary: [] Cystitis     [] Bladder spasms  [] Incontinence   Skin: [x] ndGndrndanddndend:nd nd2nd Comments/Notes:      [x] Review of labs, images, dosimetry, dose delivered, & treatment parameters.    Comments:     [x] Patient treatment setup reviewed.    Comments:     Recommendations:  Imodium discussed  Anusol HC sup discussed but patient wants to monitor at this time.    [x] Continue RT  [] Change RT Plan [] Hold RT, length:      Approved Electronically By:  Dave Acosta MD, 1/17/2024, 15:06 EST      Confidentiality of this medical record shall be maintained except when use or disclosure is required or permitted by law, regulation or written authorization by the patient.

## 2024-01-18 ENCOUNTER — HOSPITAL ENCOUNTER (OUTPATIENT)
Dept: RADIATION ONCOLOGY | Facility: HOSPITAL | Age: 64
Discharge: HOME | End: 2024-01-18

## 2024-01-18 LAB
RAD ONC ARIA COURSE ID: NORMAL
RAD ONC ARIA COURSE LAST TREATMENT DATE: NORMAL
RAD ONC ARIA COURSE START DATE: NORMAL
RAD ONC ARIA COURSE TREATMENT ELAPSED DAYS: 38
RAD ONC ARIA FIRST TREATMENT DATE: NORMAL
RAD ONC ARIA PLAN FRACTIONS TREATED TO DATE: 1
RAD ONC ARIA PLAN ID: NORMAL
RAD ONC ARIA PLAN PRESCRIBED DOSE PER FRACTION: 1.8 GY
RAD ONC ARIA PLAN PRIMARY REFERENCE POINT: NORMAL
RAD ONC ARIA PLAN TOTAL FRACTIONS PRESCRIBED: 3
RAD ONC ARIA PLAN TOTAL PRESCRIBED DOSE: 540 CGY
RAD ONC ARIA REFERENCE POINT DOSAGE GIVEN TO DATE: 1.8 GY
RAD ONC ARIA REFERENCE POINT ID: NORMAL
RAD ONC ARIA REFERENCE POINT SESSION DOSAGE GIVEN: 1.8 GY

## 2024-01-18 PROCEDURE — 77336 RADIATION PHYSICS CONSULT: CPT | Performed by: RADIOLOGY

## 2024-01-18 PROCEDURE — 77386: CPT | Performed by: RADIOLOGY

## 2024-01-18 PROCEDURE — 77014 CHG CT GUIDANCE RADIATION THERAPY FLDS PLACEMENT: CPT | Performed by: RADIOLOGY

## 2024-01-19 ENCOUNTER — HOSPITAL ENCOUNTER (OUTPATIENT)
Dept: RADIATION ONCOLOGY | Facility: HOSPITAL | Age: 64
Discharge: HOME | End: 2024-01-19

## 2024-01-19 ENCOUNTER — HOSPITAL ENCOUNTER (OUTPATIENT)
Dept: ONCOLOGY | Facility: HOSPITAL | Age: 64
Discharge: HOME | End: 2024-01-19
Admitting: STUDENT IN AN ORGANIZED HEALTH CARE EDUCATION/TRAINING PROGRAM
Payer: COMMERCIAL

## 2024-01-19 DIAGNOSIS — C20 RECTAL CANCER: ICD-10-CM

## 2024-01-19 DIAGNOSIS — Z45.2 ENCOUNTER FOR CARE RELATED TO VASCULAR ACCESS PORT: Primary | ICD-10-CM

## 2024-01-19 LAB
RAD ONC ARIA COURSE ID: NORMAL
RAD ONC ARIA COURSE LAST TREATMENT DATE: NORMAL
RAD ONC ARIA COURSE START DATE: NORMAL
RAD ONC ARIA COURSE TREATMENT ELAPSED DAYS: 39
RAD ONC ARIA FIRST TREATMENT DATE: NORMAL
RAD ONC ARIA PLAN FRACTIONS TREATED TO DATE: 2
RAD ONC ARIA PLAN ID: NORMAL
RAD ONC ARIA PLAN PRESCRIBED DOSE PER FRACTION: 1.8 GY
RAD ONC ARIA PLAN PRIMARY REFERENCE POINT: NORMAL
RAD ONC ARIA PLAN TOTAL FRACTIONS PRESCRIBED: 3
RAD ONC ARIA PLAN TOTAL PRESCRIBED DOSE: 540 CGY
RAD ONC ARIA REFERENCE POINT DOSAGE GIVEN TO DATE: 3.6 GY
RAD ONC ARIA REFERENCE POINT ID: NORMAL
RAD ONC ARIA REFERENCE POINT SESSION DOSAGE GIVEN: 1.8 GY

## 2024-01-19 PROCEDURE — 77386: CPT | Performed by: RADIOLOGY

## 2024-01-19 PROCEDURE — 25010000002 HEPARIN LOCK FLUSH PER 10 UNITS: Performed by: STUDENT IN AN ORGANIZED HEALTH CARE EDUCATION/TRAINING PROGRAM

## 2024-01-19 RX ORDER — SODIUM CHLORIDE 0.9 % (FLUSH) 0.9 %
20 SYRINGE (ML) INJECTION AS NEEDED
Status: DISCONTINUED | OUTPATIENT
Start: 2024-01-19 | End: 2024-01-20 | Stop reason: HOSPADM

## 2024-01-19 RX ORDER — SODIUM CHLORIDE 0.9 % (FLUSH) 0.9 %
20 SYRINGE (ML) INJECTION AS NEEDED
OUTPATIENT
Start: 2024-01-19

## 2024-01-19 RX ORDER — HEPARIN SODIUM (PORCINE) LOCK FLUSH IV SOLN 100 UNIT/ML 100 UNIT/ML
500 SOLUTION INTRAVENOUS AS NEEDED
Status: DISCONTINUED | OUTPATIENT
Start: 2024-01-19 | End: 2024-01-20 | Stop reason: HOSPADM

## 2024-01-19 RX ORDER — HEPARIN SODIUM (PORCINE) LOCK FLUSH IV SOLN 100 UNIT/ML 100 UNIT/ML
500 SOLUTION INTRAVENOUS AS NEEDED
OUTPATIENT
Start: 2024-01-19

## 2024-01-19 RX ADMIN — HEPARIN 500 UNITS: 100 SYRINGE at 15:00

## 2024-01-19 RX ADMIN — Medication 20 ML: at 15:00

## 2024-01-19 NOTE — PROGRESS NOTES
HEMATOLOGY ONCOLOGY OUTPATIENT FOLLOW UP       Patient name: Xochitl Hanson  : 1960  MRN: [id number]  Primary Care Physician: Provider, No Known  Referring Physician: No ref. provider found  Reason For Consult:       History of Present Illness:  Patient is a 64 y.o. female with known diagnosis of stage III adenocarcinoma of the rectum who is presented today to establish her care.  She was previously following with Dr. Tamayo.  Her oncologic history is summarized as follows:    Patient had rectal bleeding for several months but amount had increased significantly During 2023. No symptoms of weight loss, rectal pain, or difficulty moving bowels. Previous colonoscopy completed 10 years ago ().     She underwent colonoscopy on 6/15/2023 with Dr. Horan at [facility]. Findings include a single sessile 3 mm polyp of benign appearance found in the cecum (polypectomy), and ulcerated mass with stigmata of recent bleeding of malignant appearance found in the rectum at the distance between 12 cm and 16 cm from the anus causing partial obstruction. Multiple cold forceps biopsies were performed. Pathology from the polypectomy revealed mucosal polyp with predominant benign lymphoid aggregate, negative for adenoma. Pathology from the rectal mass revealed invasive moderately differentiated adenocarcinoma with focal mucinous component. No loss of mucoid expression of MMR proteins and no evidence of deficient mismatch pair.  She was referred to Dr. Tamayo.     CT CAP on 2023 at [facility] showed no evidence of metastatic disease in the chest. Large hiatal hernia. Rectal mass with suggestion of local invasion of adjacent fat with several small but suspicious lymph nodes adjacent to the lesion. No evidence of distant metastatic disease to the liver or retroperitoneum.      She was seen in consult by Dr. Quintin Tamayo ([facility]) on 2023.  Planned to  obtain baseline labs including CBC, CMP, and CEA level   MRI of the rectum ordered to complete staging. She was referred to Dr. Fairchild for consideration of surgery.     She was seen in consult by Dr. Vannesa Fairchild ([facility]) on 6/27/2023. Rigid proctoscopy completed in the office. Tumor appeared to be in the upper rectum both on rigid proctoscopy and colonoscopy. If so, patient may be able to avoid XRT. Awaiting MRI and discussion at Multi-D Tumor Board. Discussed that if early-stage or upper rectal, may go straight to surgery versus neoadjuvant chemo.     MRI at [place]-- Not available presently.     [facility] Multi-D Tumor Board on 7/5/2023 recommended neoadjuvant chemoradiation due to early T3a extension to muscularis propria and 5 mm lymph nodes noted on MRI. No EVMI.    7/27/23: Patient started Neoadjuvant Chemotherapy with FOLFOXIRI.  Cycle 1 was complicated by an infusion reaction to irinotecan which was managed with dexamethasone, Pepcid and Benadryl.  PEG filgrastim was added with cycle 2 FOLFOXIRI due to cytopenias.    7/27/2023: C1 D1 FOLFOXIRI  8/15/2023: C2 D1 FOLFOXIRI  10/9/2023: C6 D1 FOLFOXIRI  10/20/2023: Chemotherapy dose reduced by 20% due to thrombocytopenia.    10/27/2023: CT chest abdomen pelvis with contrast done at [facility] revealed patient's known rectal malignancy not well-visualized.  There was decreased surrounding perirectal fat stranding and no evidence of abdominopelvic metastatic disease.  CT chest was negative for any evidence of metastatic disease.    11/13/2023: C8-D1 FOLFOXIRI     11/30/2023: CT simulation for neoadjuvant radiation with concurrent 5-FU.    12/11/2023: Patient started on neoadjuvant radiation with weekly 5-FU infusion.     12/27/23: Patient presents for initial consultation today, She is undergoing neoadjuvant ChemoRT and is tolerating therapy relatively well. Denied any significant treatment related adverse effects presently, except for  fatigue. No weight/appetite changes, no Rectal bleeding. Cancer history as above.    1/8/23: patient resumed her concurrent ChemoRT at [facility].    Subjective:  Patient seen today for follow up, she has completed her concurrent ChemoRT this AM. Tolerated the treatment well, had some loose stools and rectal tenesmus during ChemoRT, however denied other symptoms. Weight/appetite remained stable. Denied any new complaints. She has mild Neuropathy in fingertips, no motor weakness or LE Symptoms. ROS otherwise negative.   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    Rectal cancer 06/27/2023       Past Surgical History:   Procedure Laterality Date    ENDOMETRIAL ABLATION      TUBAL ABDOMINAL LIGATION           Current Outpatient Medications:     acetaminophen (TYLENOL) 650 MG 8 hr tablet, Take 1 tablet by mouth Every 8 (Eight) Hours As Needed., Disp: , Rfl:     cetirizine (zyrTEC) 10 MG tablet, Take 1 tablet by mouth Daily., Disp: , Rfl:     diphenoxylate-atropine (Lomotil) 2.5-0.025 MG per tablet, Take 1-2 tablets by mouth 4 (Four) Times a Day As Needed for Diarrhea., Disp: 120 tablet, Rfl: 1    DULoxetine (CYMBALTA) 20 MG capsule, Take 1 capsule by mouth Daily. (Patient not taking: Reported on 12/27/2023), Disp: , Rfl:     DULoxetine HCl 40 MG capsule delayed-release particles, 1 capsule Daily., Disp: , Rfl:     esomeprazole (nexIUM) 40 MG  capsule, Take 1 capsule by mouth Every Morning Before Breakfast., Disp: , Rfl:     hydrOXYzine (ATARAX) 50 MG tablet, Take 1 tablet by mouth 3 (Three) Times a Day As Needed. (Patient not taking: Reported on 10/31/2023), Disp: , Rfl:     ibuprofen (ADVIL,MOTRIN) 200 MG tablet, Take 1 tablet by mouth Every 6 (Six) Hours As Needed., Disp: , Rfl:     lisinopril (PRINIVIL,ZESTRIL) 20 MG tablet, Take 1 tablet by mouth Daily., Disp: , Rfl:     LORazepam (ATIVAN) 1 MG tablet, Take 1 tablet by mouth Every 8 (Eight) Hours As Needed for Anxiety. (Patient not taking: Reported on 12/27/2023), Disp: , Rfl:     methocarbamol (ROBAXIN) 750 MG tablet, Take 1 tablet by mouth Every 8 (Eight) Hours As Needed., Disp: , Rfl:     multivitamin (THERAGRAN) tablet tablet, Take 1 tablet by mouth Daily., Disp: , Rfl:     ondansetron (ZOFRAN) 4 MG tablet, Take 1 tablet by mouth Every 8 (Eight) Hours As Needed for Nausea or Vomiting. (Patient not taking: Reported on 12/27/2023), Disp: , Rfl:     potassium chloride (MICRO-K) 10 MEQ CR capsule, Take 1 capsule by mouth 2 (Two) Times a Day., Disp: , Rfl:     promethazine (PHENERGAN) 12.5 MG tablet, Take 1 tablet by mouth Every 6 (Six) Hours As Needed for Nausea or Vomiting. (Patient not taking: Reported on 12/27/2023), Disp: , Rfl:     No Known Allergies    Family History   Problem Relation Age of Onset    Prostate cancer Brother        Cancer-related family history includes Prostate cancer in her brother.      Social History     Tobacco Use    Smoking status: Never    Smokeless tobacco: Never   Vaping Use    Vaping Use: Never used   Substance Use Topics    Alcohol use: Never    Drug use: Never     Social History     Social History Narrative    Not on file       ROS:   Review of Systems   Constitutional: Negative.    HENT: Negative.     Eyes: Negative.    Respiratory: Negative.     Cardiovascular: Negative.    Gastrointestinal: Negative.    Endocrine: Negative.    Genitourinary: Negative.   "  Musculoskeletal: Negative.    Skin: Negative.    Allergic/Immunologic: Negative.    Neurological:  Positive for numbness (bilateral hand and feet numbness).   Hematological: Negative.    Psychiatric/Behavioral: Negative.           Objective:    Vital Signs:  Vitals:    01/22/24 0831   BP: 145/82   Pulse: 100   Resp: 16   Temp: 98 °F (36.7 °C)   TempSrc: Infrared   SpO2: 98%   Weight: 80.5 kg (177 lb 6.4 oz)   Height: 167.6 cm (66\")   PainSc: 0-No pain         Body mass index is 28.63 kg/m².    ECOG  (0) Fully active, able to carry on all predisease performance without restriction    Physical Exam:   Physical Exam  Constitutional:       Appearance: Normal appearance. She is normal weight.   HENT:      Head: Normocephalic and atraumatic.      Right Ear: External ear normal.      Left Ear: External ear normal.      Nose: Nose normal.      Mouth/Throat:      Mouth: Mucous membranes are moist.      Pharynx: Oropharynx is clear.   Eyes:      Extraocular Movements: Extraocular movements intact.      Conjunctiva/sclera: Conjunctivae normal.      Pupils: Pupils are equal, round, and reactive to light.   Cardiovascular:      Rate and Rhythm: Normal rate and regular rhythm.      Pulses: Normal pulses.      Heart sounds: Normal heart sounds.   Pulmonary:      Effort: Pulmonary effort is normal.      Breath sounds: Normal breath sounds.   Abdominal:      General: Abdomen is flat. Bowel sounds are normal.      Palpations: Abdomen is soft.   Musculoskeletal:         General: Normal range of motion.      Cervical back: Normal range of motion and neck supple.   Skin:     General: Skin is warm.   Neurological:      Mental Status: She is alert.   Psychiatric:         Mood and Affect: Mood normal.         Behavior: Behavior normal.         Thought Content: Thought content normal.         Judgment: Judgment normal.         Lab Results - Last 18 Months   Lab Units 01/15/24  0813 01/08/24  0809 07/19/23  1157   WBC 10*3/mm3 6.26 5.56 " 7.40   HEMOGLOBIN g/dL 11.2* 11.5* 13.3   HEMATOCRIT % 35.0 36.2 40.7   PLATELETS 10*3/mm3 271 259 354   MCV fL 102.6* 103.7* 88.7       Lab Results - Last 18 Months   Lab Units 07/19/23  1157   INR  <0.93*   APTT seconds 23.5*       Lab Results   Component Value Date    PTT 23.5 (L) 07/19/2023    INR <0.93 (L) 07/19/2023         Assessment & Plan :      Stage III Rectal Adenocarcinoma: MSI-H  -Patient presented today for establishment of care, outside medical records, imaging findings, disease prognosis and treatment options were reviewed with the patient.  -Patient has completed Neoadjuvant Chemotherapy with FOLFOXIRI X 8 cycles with minimal adverse effects and no residual toxicities, her performance status is near baseline.  -Recent imaging on 10/27/23 as per outside records showed good treatment response.  -Patient has completed Neoadjuvant Chemo-XRT with Concurrent 5FU infusion [weekly 5FU at 275mg/m2 dose over 96 hour infusion (M-F)]. She tolerated treatment well.  -Will need repeat staging scans including MRI Pelvis following completion of therapy to assess ongoing treatment response.Will defer to CRS on timing and type of scans (CT vs PET)  -Continue to follow with Dr. Acosta (radiation Oncology) and Dr. Fairchild ([facility] for further management.    Peripheral neuropathy: Grade 1, Will continue to monitor. Patient wishes to use MVI supplement which is acceptable. B12 low normal.    Macrocytic anemia: Likely Sec to Chemotherapy.  B12 low normal, Folate WNL.  Iron panel showed features of ACD with likely YRN component. Will monitor and consider Iron infusions as needed. Hb/Hct near normal.    Follow up in 6 weeks following restaging scans. Sooner as needed.      Thank you very much for providing the opportunity to participate in this patient’s care. Please do not hesitate to call if there are any other questions.

## 2024-01-22 ENCOUNTER — OFFICE VISIT (OUTPATIENT)
Dept: ONCOLOGY | Facility: CLINIC | Age: 64
End: 2024-01-22
Payer: COMMERCIAL

## 2024-01-22 ENCOUNTER — RADIATION ONCOLOGY WEEKLY ASSESSMENT (OUTPATIENT)
Dept: RADIATION ONCOLOGY | Facility: HOSPITAL | Age: 64
End: 2024-01-22
Payer: COMMERCIAL

## 2024-01-22 ENCOUNTER — HOSPITAL ENCOUNTER (OUTPATIENT)
Dept: RADIATION ONCOLOGY | Facility: HOSPITAL | Age: 64
Discharge: HOME | End: 2024-01-22
Payer: COMMERCIAL

## 2024-01-22 ENCOUNTER — TELEPHONE (OUTPATIENT)
Dept: RADIATION ONCOLOGY | Facility: HOSPITAL | Age: 64
End: 2024-01-22
Payer: COMMERCIAL

## 2024-01-22 ENCOUNTER — LAB (OUTPATIENT)
Dept: LAB | Facility: HOSPITAL | Age: 64
End: 2024-01-22
Payer: COMMERCIAL

## 2024-01-22 VITALS
HEIGHT: 66 IN | OXYGEN SATURATION: 98 % | RESPIRATION RATE: 16 BRPM | WEIGHT: 177.47 LBS | SYSTOLIC BLOOD PRESSURE: 145 MMHG | BODY MASS INDEX: 28.52 KG/M2 | TEMPERATURE: 98 F | DIASTOLIC BLOOD PRESSURE: 82 MMHG | HEART RATE: 100 BPM

## 2024-01-22 VITALS
WEIGHT: 177.4 LBS | RESPIRATION RATE: 16 BRPM | OXYGEN SATURATION: 98 % | HEART RATE: 100 BPM | DIASTOLIC BLOOD PRESSURE: 82 MMHG | TEMPERATURE: 98 F | HEIGHT: 66 IN | BODY MASS INDEX: 28.51 KG/M2 | SYSTOLIC BLOOD PRESSURE: 145 MMHG

## 2024-01-22 DIAGNOSIS — C20 RECTAL CANCER: Primary | ICD-10-CM

## 2024-01-22 DIAGNOSIS — G62.9 NEUROPATHY: ICD-10-CM

## 2024-01-22 LAB
ALBUMIN SERPL-MCNC: 4.4 G/DL (ref 3.5–5.2)
ALBUMIN/GLOB SERPL: 1.8 G/DL
ALP SERPL-CCNC: 116 U/L (ref 39–117)
ALT SERPL W P-5'-P-CCNC: 24 U/L (ref 1–33)
ANION GAP SERPL CALCULATED.3IONS-SCNC: 12 MMOL/L (ref 5–15)
AST SERPL-CCNC: 19 U/L (ref 1–32)
BASOPHILS # BLD AUTO: 0.01 10*3/MM3 (ref 0–0.2)
BASOPHILS NFR BLD AUTO: 0.2 % (ref 0–1.5)
BILIRUB SERPL-MCNC: 0.5 MG/DL (ref 0–1.2)
BUN SERPL-MCNC: 15 MG/DL (ref 8–23)
BUN/CREAT SERPL: 19 (ref 7–25)
CALCIUM SPEC-SCNC: 9.5 MG/DL (ref 8.6–10.5)
CHLORIDE SERPL-SCNC: 103 MMOL/L (ref 98–107)
CO2 SERPL-SCNC: 27 MMOL/L (ref 22–29)
CREAT SERPL-MCNC: 0.79 MG/DL (ref 0.57–1)
DEPRECATED RDW RBC AUTO: 62.9 FL (ref 37–54)
EGFRCR SERPLBLD CKD-EPI 2021: 84.7 ML/MIN/1.73
EOSINOPHIL # BLD AUTO: 0.32 10*3/MM3 (ref 0–0.4)
EOSINOPHIL NFR BLD AUTO: 5.5 % (ref 0.3–6.2)
ERYTHROCYTE [DISTWIDTH] IN BLOOD BY AUTOMATED COUNT: 17.2 % (ref 12.3–15.4)
GLOBULIN UR ELPH-MCNC: 2.4 GM/DL
GLUCOSE SERPL-MCNC: 110 MG/DL (ref 65–99)
HCT VFR BLD AUTO: 38.2 % (ref 34–46.6)
HGB BLD-MCNC: 12.1 G/DL (ref 12–15.9)
HOLD SPECIMEN: NORMAL
LYMPHOCYTES # BLD AUTO: 0.63 10*3/MM3 (ref 0.7–3.1)
LYMPHOCYTES NFR BLD AUTO: 10.8 % (ref 19.6–45.3)
MCH RBC QN AUTO: 33 PG (ref 26.6–33)
MCHC RBC AUTO-ENTMCNC: 31.7 G/DL (ref 31.5–35.7)
MCV RBC AUTO: 104.1 FL (ref 79–97)
MONOCYTES # BLD AUTO: 0.5 10*3/MM3 (ref 0.1–0.9)
MONOCYTES NFR BLD AUTO: 8.5 % (ref 5–12)
NEUTROPHILS NFR BLD AUTO: 4.39 10*3/MM3 (ref 1.7–7)
NEUTROPHILS NFR BLD AUTO: 75 % (ref 42.7–76)
PLATELET # BLD AUTO: 300 10*3/MM3 (ref 140–450)
PMV BLD AUTO: 8.3 FL (ref 6–12)
POTASSIUM SERPL-SCNC: 4.1 MMOL/L (ref 3.5–5.2)
PROT SERPL-MCNC: 6.8 G/DL (ref 6–8.5)
RAD ONC ARIA COURSE ID: NORMAL
RAD ONC ARIA COURSE LAST TREATMENT DATE: NORMAL
RAD ONC ARIA COURSE START DATE: NORMAL
RAD ONC ARIA COURSE TREATMENT ELAPSED DAYS: 42
RAD ONC ARIA FIRST TREATMENT DATE: NORMAL
RAD ONC ARIA PLAN FRACTIONS TREATED TO DATE: 3
RAD ONC ARIA PLAN ID: NORMAL
RAD ONC ARIA PLAN PRESCRIBED DOSE PER FRACTION: 1.8 GY
RAD ONC ARIA PLAN PRIMARY REFERENCE POINT: NORMAL
RAD ONC ARIA PLAN TOTAL FRACTIONS PRESCRIBED: 3
RAD ONC ARIA PLAN TOTAL PRESCRIBED DOSE: 540 CGY
RAD ONC ARIA REFERENCE POINT DOSAGE GIVEN TO DATE: 5.4 GY
RAD ONC ARIA REFERENCE POINT ID: NORMAL
RAD ONC ARIA REFERENCE POINT SESSION DOSAGE GIVEN: 1.8 GY
RBC # BLD AUTO: 3.67 10*6/MM3 (ref 3.77–5.28)
SODIUM SERPL-SCNC: 142 MMOL/L (ref 136–145)
WBC NRBC COR # BLD AUTO: 5.85 10*3/MM3 (ref 3.4–10.8)

## 2024-01-22 PROCEDURE — 36415 COLL VENOUS BLD VENIPUNCTURE: CPT

## 2024-01-22 PROCEDURE — 77386: CPT | Performed by: RADIOLOGY

## 2024-01-22 PROCEDURE — 85025 COMPLETE CBC W/AUTO DIFF WBC: CPT

## 2024-01-22 PROCEDURE — 99214 OFFICE O/P EST MOD 30 MIN: CPT | Performed by: STUDENT IN AN ORGANIZED HEALTH CARE EDUCATION/TRAINING PROGRAM

## 2024-01-22 PROCEDURE — 80053 COMPREHEN METABOLIC PANEL: CPT | Performed by: STUDENT IN AN ORGANIZED HEALTH CARE EDUCATION/TRAINING PROGRAM

## 2024-01-22 NOTE — PROGRESS NOTES
Patient Name: Xochitl Hanson    Date: 2024  : 1960       MRN: [id number]     Referring Physician: Provider, No Known  DX:   Encounter Diagnosis   Name Primary?    Rectal cancer Yes        COMPLETION NOTE      Primary Radiation Oncologist: Dave Acosta MD    PRIMARY SITE AND HISTOPATHOLOGY:   Rectal adenocarcinoma, moderately differentiated      STAGE: Stage IIIB, T3N1M0      SIGNIFICANT LAB AND X-RAY FINDINGS: The patient is a 64 y.o. year old female who was last seen in our office on 2023.     Again, her cancer history is as follows:  Xochitl Hanson is a 64 y.o. female who was recently diagnosed with rectal cancer after completing work-up for with ericka rectal bleeding presentation.  Patient had rectal bleeding for several months prior to work-up but amount had increased significantly 2 weeks prior to colonoscopy. No family history of colon cancer. No symptoms of weight loss, rectal pain, or difficulty moving bowels. Previous colonoscopy completed 10 years ago ().     She underwent colonoscopy on 6/15/2023 with Dr. Horan at [facility]. Findings include a single sessile 3 mm polyp of benign appearance found in the cecum (polypectomy), and ulcerated mass with stigmata of recent bleeding of malignant appearance found in the rectum at the distance between 12 cm and 16 cm from the anus causing partial obstruction. Multiple cold forceps biopsies were performed. Pathology from the polypectomy revealed mucosal polyp with predominant benign lymphoid aggregate, negative for adenoma. Pathology from the rectal mass revealed invasive moderately differentiated adenocarcinoma with focal mucinous component. No loss of mucoid expression of MMR proteins and no evidence of deficient mismatch pair.  She was referred to Dr. Tamayo.     CT CAP on 2023 at [facility] showed no evidence of metastatic disease in the chest. Large hiatal hernia. Rectal mass with suggestion of local  invasion of adjacent fat with several small but suspicious lymph nodes adjacent to the lesion. No evidence of distant metastatic disease to the liver or retroperitoneum.      She was seen in consult by Dr. Quintin Tamayo ([facility]) on 6/20/2023. Results of CT CAP pending at that time. Planned to obtain baseline labs including CBC, CMP, and CEA level   MRI of the rectum ordered to complete staging. She was referred to Dr. Fairchild for consideration of surgery.     She was seen in consult by Dr. Vannesa Fairchild ([facility]) on 6/27/2023. Rigid proctoscopy completed in the office. Tumor appeared to be in the upper rectum both on rigid proctoscopy and colonoscopy. If so, patient may be able to avoid XRT. Awaiting MRI and discussion at Multi-D Tumor Board. Discussed that if early-stage or upper rectal, may go straight to surgery versus neoadjuvant chemo.     MRI at [place]--we are still needing report and copy of the radiology read.     [facility] Multi-D Tumor Board on 7/5/2023 recommended neoadjuvant chemoradiation due to early T3a extension to muscularis propria and 5 mm lymph nodes noted on MRI. No EVMI.        She has had improvement on the neoadjuvant BECK, with great response seen on OSH CT.      PLAN OF TREATMENT:  Neoadjuvant, BECK then chemoXRT before definitive surgery   Treatment Site Ref. ID Energy Dose/Fx (cGy) #Fx Dose Correction (cGy) Total Dose (cGy) Start Date End Date Elapsed Days   Pelvis init Pelvis 10X 180 25 / 25 0 4,500 12/11/2023 1/17/2024 37   Rectal Bst Rectal Bst 10X 180 3 / 3 0 540 1/18/2024 1/22/2024 4       DATE STARTED:  12/11/2023   DATE COMPLETED:  1/22/2024      TOTAL DOSE: 45 Gy in 25 fractions to the pelvis followed by [place] boost of 5.4 Gy in 3 fractions to the rectum+lavelle/mesorectal region   DOSE/FRACTION: All 28 fractions were at 1.8 Gy per fraction  ENERGY: 10 MV photons.   STATUS OF TUMOR/PATIENT AT COMPLETION OF RADIOTHERAPY: stable      TOLERANCE: No unexpected  toxicities or treatment breaks  Aquaphor to AV/cleft and groin folds BID  Imodium prn      DISPOSITION:  Alerting colorectal surgery, Dr. Fairchild at , to the patient's completion.  1 month f/u here  Scope/scans as needed by Dr. Fairchild for pre-op evaluation.      Current Outpatient Medications:     acetaminophen (TYLENOL) 650 MG 8 hr tablet, Take 1 tablet by mouth Every 8 (Eight) Hours As Needed., Disp: , Rfl:     cetirizine (zyrTEC) 10 MG tablet, Take 1 tablet by mouth Daily., Disp: , Rfl:     diphenoxylate-atropine (Lomotil) 2.5-0.025 MG per tablet, Take 1-2 tablets by mouth 4 (Four) Times a Day As Needed for Diarrhea., Disp: 120 tablet, Rfl: 1    DULoxetine (CYMBALTA) 20 MG capsule, Take 1 capsule by mouth Daily., Disp: , Rfl:     DULoxetine HCl 40 MG capsule delayed-release particles, 1 capsule Daily., Disp: , Rfl:     esomeprazole (nexIUM) 40 MG capsule, Take 1 capsule by mouth Every Morning Before Breakfast., Disp: , Rfl:     hydrOXYzine (ATARAX) 50 MG tablet, Take 1 tablet by mouth 3 (Three) Times a Day As Needed., Disp: , Rfl:     ibuprofen (ADVIL,MOTRIN) 200 MG tablet, Take 1 tablet by mouth Every 6 (Six) Hours As Needed., Disp: , Rfl:     lisinopril (PRINIVIL,ZESTRIL) 20 MG tablet, Take 1 tablet by mouth Daily., Disp: , Rfl:     LORazepam (ATIVAN) 1 MG tablet, Take 1 tablet by mouth Every 8 (Eight) Hours As Needed for Anxiety., Disp: , Rfl:     methocarbamol (ROBAXIN) 750 MG tablet, Take 1 tablet by mouth Every 8 (Eight) Hours As Needed., Disp: , Rfl:     multivitamin (THERAGRAN) tablet tablet, Take 1 tablet by mouth Daily., Disp: , Rfl:     ondansetron (ZOFRAN) 4 MG tablet, Take 1 tablet by mouth Every 8 (Eight) Hours As Needed for Nausea or Vomiting., Disp: , Rfl:     potassium chloride (MICRO-K) 10 MEQ CR capsule, Take 1 capsule by mouth 2 (Two) Times a Day., Disp: , Rfl:     promethazine (PHENERGAN) 12.5 MG tablet, Take 1 tablet by mouth Every 6 (Six) Hours As Needed for Nausea or Vomiting.,  Disp: , Rfl:     KPS: 90            cc: MD Krunal Marshall MD        Approved Electronically By:  Dave Acosta MD, 1/22/2024, 13:52 EST                              Confidentiality of this medical record shall be maintained except when use or disclosure is required or permitted by law, regulation or written authorization by the patient.

## 2024-01-22 NOTE — PATIENT INSTRUCTIONS
RADIATION THERAPY DISCHARGE INSTRUCTIONS  Pelvis    CONGRATULATIONS! You completed 28 radiation treatments for treatment of your rectal cancer. These discharge instructions are important for you to follow until your one-month follow up appointment with Dr. Acosta. Please make sure to review these instructions and call the Radiation Oncology Department if you have any questions or concerns with symptoms you may experience. Thank you for trusting us with your cancer treatment!    DIET  Eat a regular, well balanced diet that is high in protein such as meat, eggs, cheese, and nut butters  Drink 48 to 64 ounces of fluid daily  Use nutritional supplements if you are not able to eat full meals  Monitor your weight and report continued weight loss to your doctor    MEDICATIONS  Use Tylenol as needed to decrease discomfort and irritation to treatment area  Take pain medications only as prescribed  Take a laxative or stool softener as needed to prevent constipation due to pain medications  Use Imodium (up to 8 pills per day) as needed for loose stools    SKIN CARE  Wash treated skin gently with your hands using a mild, non-drying soap such as Dove® or Aveeno® until skin returns to normal  Pat skin dry - do not rub  Keep treated skin moist with frequent applications of Aquaphor® or unscented lotion (such as Eucerin)®  Always protect your treated skin when outdoors by wearing protective clothing and applying sunscreen SPF 15 or higher at least 30 minutes before going outdoors and reapply frequently    ACTIVITY  Fatigue is a normal side effect of radiation therapy and should improve over time  Alternate rest and activity  Exercise such as walking may help to improve your fatigue    FOLLOW-UP  Continue follow-up appointments with all other doctors as necessary  Call your radiation oncology doctor if you are concerned with any side effects you are experiencing: [phone]    SPECIAL INSTRUCTIONS  Reintroduce fiber into your  diet slowly so you will know which foods cause loose stools or cramps  Use sitz baths as directed  Side effects should subside in a couple weeks; tell your doctor if you have increased burning, frequency, or blood in your urine or stool    _______________________________________________________________________    Completed by: Camille Nunez RN on 1/22/2024 at 07:46 EST

## 2024-01-22 NOTE — PROGRESS NOTES
"NAME: Xochitl Hanson DATE: 2024   : 1960    MRN: [id number] DIAGNOSIS:   Encounter Diagnosis   Name Primary?    Rectal cancer Yes          RADIATION ON TREATMENT VISIT NOTE - PELVIS    Treatment Summary    [x] Concurrent Chemo   Regimen:  5FU ([facility])      Treatment Site Ref. ID Energy Dose/Fx (cGy) #Fx Dose Correction (cGy) Total Dose (cGy) Start Date End Date Elapsed Days   Pelvis init Pelvis 10X 180  0 4,500 2023 37   Rectal Bst Rectal Bst 10X 180 3 / 3 0 540 2024 4         General     Review of Systems:  [] No new complaints  [] Nocturia  [] Slow urinary stream  [] Difficulty initiating urination [] Dysuria  [] Vaginal discharge  [] Increased frequency of urination [x] Soft/loose stool [] Diarrhea  [] Rectal burning   [] Skin soreness, location:   [x] Fatigue, severity: 1  [x] Pain, severity: 0, location: denies pain  [] Bladder medication regimen:    [] Pain medication regimen:    [x] Bowel regimen:  Imodium  [x] Skin regimen:  Aquaphor to rectum and groin folds at least BID    Subjective:  She finished treatments today, she does have any concerns or complaints.    KPS: 90     Vital Signs: /82   Pulse 100   Temp 98 °F (36.7 °C) (Infrared)   Resp 16   Ht 167.6 cm (66\")   Wt 80.5 kg (177 lb 7.5 oz)   SpO2 98%   BMI 28.64 kg/m²     Weight:   Wt Readings from Last 3 Encounters:   24 80.5 kg (177 lb 7.5 oz)   24 80.5 kg (177 lb 6.4 oz)   24 79.6 kg (175 lb 6.4 oz)       Medication:   Current Outpatient Medications:     acetaminophen (TYLENOL) 650 MG 8 hr tablet, Take 1 tablet by mouth Every 8 (Eight) Hours As Needed., Disp: , Rfl:     cetirizine (zyrTEC) 10 MG tablet, Take 1 tablet by mouth Daily., Disp: , Rfl:     diphenoxylate-atropine (Lomotil) 2.5-0.025 MG per tablet, Take 1-2 tablets by mouth 4 (Four) Times a Day As Needed for Diarrhea., Disp: 120 tablet, Rfl: 1    DULoxetine (CYMBALTA) 20 MG capsule, Take 1 capsule by mouth " Daily., Disp: , Rfl:     DULoxetine HCl 40 MG capsule delayed-release particles, 1 capsule Daily., Disp: , Rfl:     esomeprazole (nexIUM) 40 MG capsule, Take 1 capsule by mouth Every Morning Before Breakfast., Disp: , Rfl:     hydrOXYzine (ATARAX) 50 MG tablet, Take 1 tablet by mouth 3 (Three) Times a Day As Needed., Disp: , Rfl:     ibuprofen (ADVIL,MOTRIN) 200 MG tablet, Take 1 tablet by mouth Every 6 (Six) Hours As Needed., Disp: , Rfl:     lisinopril (PRINIVIL,ZESTRIL) 20 MG tablet, Take 1 tablet by mouth Daily., Disp: , Rfl:     LORazepam (ATIVAN) 1 MG tablet, Take 1 tablet by mouth Every 8 (Eight) Hours As Needed for Anxiety., Disp: , Rfl:     methocarbamol (ROBAXIN) 750 MG tablet, Take 1 tablet by mouth Every 8 (Eight) Hours As Needed., Disp: , Rfl:     multivitamin (THERAGRAN) tablet tablet, Take 1 tablet by mouth Daily., Disp: , Rfl:     ondansetron (ZOFRAN) 4 MG tablet, Take 1 tablet by mouth Every 8 (Eight) Hours As Needed for Nausea or Vomiting., Disp: , Rfl:     potassium chloride (MICRO-K) 10 MEQ CR capsule, Take 1 capsule by mouth 2 (Two) Times a Day., Disp: , Rfl:     promethazine (PHENERGAN) 12.5 MG tablet, Take 1 tablet by mouth Every 6 (Six) Hours As Needed for Nausea or Vomiting., Disp: , Rfl:      LABS (Reviewed):  Hematology  WBC   Date Value Ref Range Status   01/22/2024 5.85 3.40 - 10.80 10*3/mm3 Final     RBC   Date Value Ref Range Status   01/22/2024 3.67 (L) 3.77 - 5.28 10*6/mm3 Final     Hemoglobin   Date Value Ref Range Status   01/22/2024 12.1 12.0 - 15.9 g/dL Final     Hematocrit   Date Value Ref Range Status   01/22/2024 38.2 34.0 - 46.6 % Final     Platelets   Date Value Ref Range Status   01/22/2024 300 140 - 450 10*3/mm3 Final     Chemistry   Lab Results   Component Value Date    GLUCOSE 110 (H) 01/22/2024    BUN 15 01/22/2024    CREATININE 0.79 01/22/2024    BCR 19.0 01/22/2024    K 4.1 01/22/2024    CO2 27.0 01/22/2024    CALCIUM 9.5 01/22/2024    ALBUMIN 4.4 01/22/2024    AST 19  01/22/2024    ALT 24 01/22/2024       Physical Exam     Abdomen: [x] Soft          [x] Bowel sounds   GYN: [] Vaginal discharge   Extremities: [] Edema   GI: [x] Diarrhea   [] Enteritis  [] Proctitis    [] Vomiting   Renal/Genitourinary: [] Cystitis     [] Bladder spasms  [] Incontinence   Skin: [x] ndGndrndanddndend:nd nd2nd Comments/Notes:      [x] Review of labs, images, dosimetry, dose delivered, & treatment parameters.    Comments:     [x] Patient treatment setup reviewed.    Comments:     Recommendations:  discussed completion with Dr. Darby  We are contacting Dr. Fairchild to alert that patient has completed BECK and chemoXRT  Continue imodium, aquaphor and supportive measures. Has zofran as needed.      Approved Electronically By:  Dave Acosta MD, 1/22/2024, 10:44 EST      Confidentiality of this medical record shall be maintained except when use or disclosure is required or permitted by law, regulation or written authorization by the patient.

## 2024-01-22 NOTE — TELEPHONE ENCOUNTER
Radiation Oncology Nurse Note    Called and informed April that patient has completed XRT as of today, per request of Dr. Acosta. April verbalized understanding and stated she would inform Dr. Fairchild.    Camille Nunez RN, BSN  Radiation Oncology Department  [facility]  [phone]  Office  [phone]  Fax

## 2024-01-22 NOTE — PROGRESS NOTES
RADIATION THERAPY COMPLETION and DISCHARGE     Xochitl Hanson completed radiation therapy on 01/22/2024 for Rectal cancer [C20]. The summary of her treatment is as follows:    TREATMENT SITE:   Pelvis Initial START DATE:   12/11/2023   TOTAL DOSE (cGy):   4500 END DATE:   01/17/2024   DOSE/FRACTION:   180 ELAPSED DAYS:   37   TOTAL FACTIONS:   25 PHYSICIAN:    Dave Acosta MD     TREATMENT SITE:   Pelvis Boost START DATE:   01/18/2024   TOTAL DOSE (cGy):   540 END DATE:   01/22/2024    DOSE/FRACTION:   180 ELAPSED DAYS:   3   TOTAL FACTIONS:   3 PHYSICIAN:    Dave Acosta MD     1-month follow-up appointment after completion of radiation therapy scheduled on 2/19/2024.    The following instructions were provided to the patient and/or family in their printed after visit summary (AVS) as well as discussed in-person by the radiation oncology nurse or medical assistant. The patient and/or family had the opportunity to ask questions and verbalized their questions were adequately answered. Encouraged patient to contact our department with any questions or concerns.  _______________________________________________________________________      RADIATION THERAPY DISCHARGE INSTRUCTIONS  Pelvis    CONGRATULATIONS! You completed 28 radiation treatments for treatment of your rectal cancer. These discharge instructions are important for you to follow until your one-month follow up appointment with Dr. Acosta. Please make sure to review these instructions and call the Radiation Oncology Department if you have any questions or concerns with symptoms you may experience. Thank you for trusting us with your cancer treatment!    DIET  Eat a regular, well balanced diet that is high in protein such as meat, eggs, cheese, and nut butters  Drink 48 to 64 ounces of fluid daily  Use nutritional supplements if you are not able to eat full meals  Monitor your weight and report continued weight loss to your doctor    MEDICATIONS  Use Tylenol as  needed to decrease discomfort and irritation to treatment area  Take pain medications only as prescribed  Take a laxative or stool softener as needed to prevent constipation due to pain medications  Use Imodium (up to 8 pills per day) as needed for loose stools    SKIN CARE  Wash treated skin gently with your hands using a mild, non-drying soap such as Dove® or Aveeno® until skin returns to normal  Pat skin dry - do not rub  Keep treated skin moist with frequent applications of Aquaphor® or unscented lotion (such as Eucerin)®  Always protect your treated skin when outdoors by wearing protective clothing and applying sunscreen SPF 15 or higher at least 30 minutes before going outdoors and reapply frequently    ACTIVITY  Fatigue is a normal side effect of radiation therapy and should improve over time  Alternate rest and activity  Exercise such as walking may help to improve your fatigue    FOLLOW-UP  Continue follow-up appointments with all other doctors as necessary  Call your radiation oncology doctor if you are concerned with any side effects you are experiencing: [phone]    SPECIAL INSTRUCTIONS  Reintroduce fiber into your diet slowly so you will know which foods cause loose stools or cramps  Use sitz baths as directed  Side effects should subside in a couple weeks; tell your doctor if you have increased burning, frequency, or blood in your urine or stool    _______________________________________________________________________    Completed by: Camille Nunez RN, Radiation Oncology Nurse on 01/22/24 at 07:44 EST

## 2024-01-25 ENCOUNTER — HOSPITAL ENCOUNTER (OUTPATIENT)
Dept: RADIATION ONCOLOGY | Facility: HOSPITAL | Age: 64
Discharge: HOME | End: 2024-01-25

## 2024-01-25 LAB
RAD ONC ARIA COURSE END DATE: NORMAL
RAD ONC ARIA COURSE ID: NORMAL
RAD ONC ARIA COURSE LAST TREATMENT DATE: NORMAL
RAD ONC ARIA COURSE START DATE: NORMAL
RAD ONC ARIA COURSE TREATMENT ELAPSED DAYS: 42
RAD ONC ARIA FIRST TREATMENT DATE: NORMAL
RAD ONC ARIA PLAN FRACTIONS TREATED TO DATE: 25
RAD ONC ARIA PLAN FRACTIONS TREATED TO DATE: 3
RAD ONC ARIA PLAN ID: NORMAL
RAD ONC ARIA PLAN ID: NORMAL
RAD ONC ARIA PLAN NAME: NORMAL
RAD ONC ARIA PLAN NAME: NORMAL
RAD ONC ARIA PLAN PRESCRIBED DOSE PER FRACTION: 1.8 GY
RAD ONC ARIA PLAN PRESCRIBED DOSE PER FRACTION: 1.8 GY
RAD ONC ARIA PLAN PRIMARY REFERENCE POINT: NORMAL
RAD ONC ARIA PLAN PRIMARY REFERENCE POINT: NORMAL
RAD ONC ARIA PLAN TOTAL FRACTIONS PRESCRIBED: 25
RAD ONC ARIA PLAN TOTAL FRACTIONS PRESCRIBED: 3
RAD ONC ARIA PLAN TOTAL PRESCRIBED DOSE: 4500 CGY
RAD ONC ARIA PLAN TOTAL PRESCRIBED DOSE: 540 CGY
RAD ONC ARIA REFERENCE POINT DOSAGE GIVEN TO DATE: 45 GY
RAD ONC ARIA REFERENCE POINT DOSAGE GIVEN TO DATE: 5.4 GY
RAD ONC ARIA REFERENCE POINT ID: NORMAL
RAD ONC ARIA REFERENCE POINT ID: NORMAL

## 2024-02-16 NOTE — PROGRESS NOTES
RADIATION ONCOLOGY FOLLOW-UP NOTE    NAME: Xochitl Hanson  YOB: 1960  MRN #: [id number]  DATE OF SERVICE: 2/19/2024  REFERRING PROVIDER: [place] [address]  PRIMARY CARE PROVIDER: Provider, No Known    DIAGNOSIS:    Encounter Diagnosis   Name Primary?    Rectal cancer Yes     REASON FOR VISIT:  1M s/p XRT F/U    RADIATION TREATMENT COURSE:  12/11/2023- 01/22/2024: 5040 cGy in 28 fractions to rectum.    HISTORY OF PRESENT ILLNESS: The patient is a 64 y.o. year old female who was last seen in our office on 01/22/2024 upon completion of radiation therapy treatment.    She follows with Dr. Darby, and was last seen on 01/22/2024. Their plan is to defer imaging back to Dr. Fairchild for CT vs. PET, and follow up in 6 weeks after those scans. F/U appointment not scheduled at this time.    The patient was last seen by Dr. Fairchild on 02/06/2024:  In 8 to 10 weeks, I plan to conduct a flexible sigmoidoscopy (flex SIG) and schedule the patient for an MRI to access her situation. I will tentatively include her in my surgical schedule for cancer excision if she decides to proceed with surgery. Will also get CTs to make sure no interval development of metastatic disease and recheck CEA level. Will discuss MRI results at tumor board, proceed with surgery if patient decides or if evidence of residual tumor on follow up. We did discuss WNW possibilities.    MRI scheduled per Dr. Fairchild.    The following portions of the patient's history were reviewed and updated as appropriate: allergies, current medications, past family history, past medical history, past social history, past surgical history and problem list. Reviewed with the patient and remain unchanged.    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    MEDICATIONS:    Current Outpatient Medications:     acetaminophen (TYLENOL) 650 MG 8 hr tablet, Take 1 tablet by mouth Every 8 (Eight) Hours As Needed., Disp: , Rfl:     cetirizine (zyrTEC) 10 MG tablet, Take 1 tablet by mouth Daily., Disp: , Rfl:     diphenoxylate-atropine (Lomotil) 2.5-0.025 MG per tablet, Take 1-2 tablets by mouth 4 (Four) Times a Day As Needed for Diarrhea., Disp: 120 tablet, Rfl: 1    DULoxetine (CYMBALTA) 20 MG capsule, Take 1 capsule by mouth Daily., Disp: , Rfl:     DULoxetine HCl 40 MG capsule delayed-release particles, 1 capsule Daily., Disp: , Rfl:     esomeprazole (nexIUM) 40 MG capsule, Take 1 capsule by mouth Every Morning Before Breakfast., Disp: , Rfl:     hydrOXYzine (ATARAX) 50 MG tablet, Take 1 tablet by mouth 3 (Three) Times a Day As Needed., Disp: , Rfl:     ibuprofen (ADVIL,MOTRIN) 200 MG tablet, Take 1 tablet by mouth Every 6 (Six) Hours As Needed., Disp: , Rfl:     lisinopril (PRINIVIL,ZESTRIL) 20 MG tablet, Take 1 tablet by mouth Daily., Disp: , Rfl:     LORazepam (ATIVAN) 1 MG tablet, Take 1 tablet by mouth Every 8 (Eight) Hours As Needed for Anxiety., Disp: , Rfl:     methocarbamol (ROBAXIN) 750 MG tablet, Take 1 tablet by mouth Every 8 (Eight) Hours As Needed., Disp: , Rfl:     multivitamin (THERAGRAN) tablet tablet, Take 1 tablet by mouth Daily., Disp: , Rfl:     ondansetron (ZOFRAN) 4 MG tablet, Take 1 tablet by mouth Every 8 (Eight) Hours As Needed for Nausea or Vomiting., Disp: , Rfl:     potassium  chloride (MICRO-K) 10 MEQ CR capsule, Take 1 capsule by mouth 2 (Two) Times a Day., Disp: , Rfl:     promethazine (PHENERGAN) 12.5 MG tablet, Take 1 tablet by mouth Every 6 (Six) Hours As Needed for Nausea or Vomiting., Disp: , Rfl:     ALLERGIES:  No Known Allergies    PAST SURGICAL HISTORY:  she has a past surgical history that includes Tubal ligation and Endometrial ablation.    PREVIOUS RADIOTHERAPY OR CHEMOTHERAPY:  Yes, patient received neoadjuvant BECK followed by definitive chemoRT to 50.4 Gy in 28 fractions.     FAMILY HISTORY:  herfamily history includes Prostate cancer in her brother.    SOCIAL HISTORY:  she reports that she has never smoked. She has never used smokeless tobacco. She reports that she does not drink alcohol and does not use drugs.    PAIN AND PAIN MANAGEMENT:  There were no vitals filed for this visit.    NUTRITIONAL STATUS:  Otherwise no issues    KPS:  90:  Minor signs or symptoms  ECOG:  (1) Restricted in physically strenuous activity, ambulatory and able to do work of light nature   PHQ-9 Total Score:       REVIEW OF SYSTEMS:   Review of Systems     Otherwise negative as below.     General: No fevers, chills, weight change, or drenching night sweats. Skin: No rashes or jaundice.  HEENT: No change in vision or hearing, no headaches.  Neck: No dysphagia or masses.  Heme/Lymph: No easy bruising or bleeding.  Respiratory System: No shortness of breath or cough.  Cardiovascular: No chest pain, palpitations, or dyspnea on exertion.  +/- Pacemaker. GI: No nausea, vomiting, diarrhea, melena, or hematochezia.  : No dysuria or hematuria.  Endocrine: No heat or cold intolerance. Musculoskeletal: No myalgias or arthralgias.  Neuro: No weakness, numbness, syncope, or seizures. Psych: No mood changes or depression. Ext: Denies swelling.    Objective   VITAL SIGNS:  There were no vitals filed for this visit.  PHYSICAL EXAM:  Physical Exam  Exam conducted with a chaperone present.   Constitutional:        General: She is not in acute distress.  HENT:      Head: Normocephalic and atraumatic.   Pulmonary:      Effort: Pulmonary effort is normal. No respiratory distress.   Genitourinary:     Comments: Perianal skin evaluated. Mild dermatitis with no pain or discomfort.   Neurological:      Mental Status: She is alert and oriented to person, place, and time. Mental status is at baseline.   Psychiatric:         Mood and Affect: Mood normal.         Behavior: Behavior normal.         PERTINENT IMAGING/PATHOLOGY/LABS (Medical Decision Making):     COORDINATION OF CARE: A copy of this note is sent to the referring provider.    PATHOLOGY (Reviewed):     IMAGING (Reviewed):     LABS (Reviewed):  HEMATOLOGY:  WBC   Date Value Ref Range Status   01/22/2024 5.85 3.40 - 10.80 10*3/mm3 Final     RBC   Date Value Ref Range Status   01/22/2024 3.67 (L) 3.77 - 5.28 10*6/mm3 Final     Hemoglobin   Date Value Ref Range Status   01/22/2024 12.1 12.0 - 15.9 g/dL Final     Hematocrit   Date Value Ref Range Status   01/22/2024 38.2 34.0 - 46.6 % Final     Platelets   Date Value Ref Range Status   01/22/2024 300 140 - 450 10*3/mm3 Final     CHEMISTRY:  Lab Results   Component Value Date    GLUCOSE 110 (H) 01/22/2024    BUN 15 01/22/2024    CREATININE 0.79 01/22/2024    BCR 19.0 01/22/2024    K 4.1 01/22/2024    CO2 27.0 01/22/2024    CALCIUM 9.5 01/22/2024    ALBUMIN 4.4 01/22/2024    AST 19 01/22/2024    ALT 24 01/22/2024     Assessment & Plan   ASSESSMENT AND PLAN:    1. Rectal cancer         No orders of the defined types were placed in this encounter.    Patient has completed chemoRT after BECK. She is recovering from treatment well. She has no significant complaints from her radiation therapy at this time.     - Vaginal dilator provided to prevent scar tissue from developing. Instructions on use were provided.  - Patient planning on surgery with Dr. Fairchild. We will plan follow-up in June after surgery.     This assessment comes  from my review of the imaging, pathology, physician notes and other pertinent information as mentioned.    DISPOSITION:   - Follow-up in June after surgery    TIME SPENT WITH PATIENT:   I spent greater than 30 minutes in face-to-face time with the patient and 20 minutes of that time were spent in counseling and coordination of care, including review of imaging and pathology; indications, goals, logistics, alternatives and risks - both common and rare - for my recommendations as well as surveillance and potential outcomes.  CC:   Jacki Fontanez MA  2/16/2024  11:58 AM EST

## 2024-02-19 ENCOUNTER — OFFICE VISIT (OUTPATIENT)
Dept: RADIATION ONCOLOGY | Facility: HOSPITAL | Age: 64
End: 2024-02-19
Payer: COMMERCIAL

## 2024-02-19 VITALS
SYSTOLIC BLOOD PRESSURE: 149 MMHG | WEIGHT: 179.4 LBS | RESPIRATION RATE: 18 BRPM | OXYGEN SATURATION: 95 % | DIASTOLIC BLOOD PRESSURE: 79 MMHG | HEART RATE: 84 BPM | BODY MASS INDEX: 28.96 KG/M2

## 2024-02-19 DIAGNOSIS — C20 RECTAL CANCER: Primary | ICD-10-CM

## 2024-02-19 PROCEDURE — G0463 HOSPITAL OUTPT CLINIC VISIT: HCPCS | Performed by: INTERNAL MEDICINE

## 2024-04-23 ENCOUNTER — TELEPHONE (OUTPATIENT)
Dept: ONCOLOGY | Facility: CLINIC | Age: 64
End: 2024-04-23
Payer: COMMERCIAL

## 2024-04-23 NOTE — TELEPHONE ENCOUNTER
Caller: Xochitl Hanson    Relationship: Self    Best call back number: [phone]      What was the call regarding: PT CALLED TO SEE ABOUT SCHEDULING TO CHAYA HER PORT REMOVED

## 2024-04-24 NOTE — TELEPHONE ENCOUNTER
Returned call to pt she stated she has had recent scan CT and MRI at [facility] she had a Flexible Sigmoidoscopy 3 weeks and her surgeon  told her she was cancer free and would have Flexible Sigmoidoscopy every 3 mos for 2 years. Informed her to reach out to Dr. Fairchild for her opinion regarding the port. Will update Dr. Darby for any follow up appts here at [facility].

## 2024-05-01 ENCOUNTER — TELEPHONE (OUTPATIENT)
Dept: ONCOLOGY | Facility: CLINIC | Age: 64
End: 2024-05-01
Payer: COMMERCIAL

## 2024-05-01 NOTE — TELEPHONE ENCOUNTER
----- Message from April C sent at 4/29/2024  9:53 AM EDT -----  Just saw that she has not followed up with me since Completion of her treatment. I would be happy to discuss with her about port removal, but can we get her scheduled for a follow up appt? Thanks  ----- Message -----  From: Viri April NNEKA Scott  Sent: 4/23/2024   5:05 PM EDT  To: Krunal Darby MD      Caller: Xochitl Hanson     Relationship: Self     Best call back number: [phone]        What was the call regarding: PT CALLED TO SEE ABOUT SCHEDULING TO CHAYA HER PORT REMOVED        Not sure the situation here, please advise.

## 2024-05-07 ENCOUNTER — APPOINTMENT (OUTPATIENT)
Dept: LAB | Facility: HOSPITAL | Age: 64
End: 2024-05-07
Payer: COMMERCIAL

## 2024-05-07 ENCOUNTER — HOSPITAL ENCOUNTER (OUTPATIENT)
Dept: ONCOLOGY | Facility: HOSPITAL | Age: 64
Discharge: HOME | End: 2024-05-07
Payer: COMMERCIAL

## 2024-05-07 ENCOUNTER — OFFICE VISIT (OUTPATIENT)
Dept: ONCOLOGY | Facility: CLINIC | Age: 64
End: 2024-05-07
Payer: COMMERCIAL

## 2024-05-07 VITALS
SYSTOLIC BLOOD PRESSURE: 130 MMHG | DIASTOLIC BLOOD PRESSURE: 80 MMHG | HEART RATE: 81 BPM | HEIGHT: 66 IN | OXYGEN SATURATION: 97 % | WEIGHT: 182 LBS | BODY MASS INDEX: 29.25 KG/M2

## 2024-05-07 DIAGNOSIS — D50.9 IRON DEFICIENCY ANEMIA, UNSPECIFIED IRON DEFICIENCY ANEMIA TYPE: ICD-10-CM

## 2024-05-07 DIAGNOSIS — Z45.2 ENCOUNTER FOR CARE RELATED TO VASCULAR ACCESS PORT: Primary | ICD-10-CM

## 2024-05-07 DIAGNOSIS — G62.9 NEUROPATHY: ICD-10-CM

## 2024-05-07 DIAGNOSIS — C20 RECTAL CANCER: ICD-10-CM

## 2024-05-07 DIAGNOSIS — D64.9 ANEMIA, UNSPECIFIED TYPE: ICD-10-CM

## 2024-05-07 DIAGNOSIS — C20 RECTAL CANCER: Primary | ICD-10-CM

## 2024-05-07 LAB
ALBUMIN SERPL-MCNC: 4.2 G/DL (ref 3.5–5.2)
ALBUMIN/GLOB SERPL: 1.8 G/DL
ALP SERPL-CCNC: 132 U/L (ref 39–117)
ALT SERPL W P-5'-P-CCNC: 41 U/L (ref 1–33)
ANION GAP SERPL CALCULATED.3IONS-SCNC: 8 MMOL/L (ref 5–15)
AST SERPL-CCNC: 18 U/L (ref 1–32)
BASOPHILS # BLD AUTO: 0.02 10*3/MM3 (ref 0–0.2)
BASOPHILS NFR BLD AUTO: 0.5 % (ref 0–1.5)
BILIRUB SERPL-MCNC: 0.6 MG/DL (ref 0–1.2)
BUN SERPL-MCNC: 15 MG/DL (ref 8–23)
BUN/CREAT SERPL: 21.7 (ref 7–25)
CALCIUM SPEC-SCNC: 9.2 MG/DL (ref 8.6–10.5)
CEA SERPL-MCNC: 1.38 NG/ML
CHLORIDE SERPL-SCNC: 106 MMOL/L (ref 98–107)
CO2 SERPL-SCNC: 25 MMOL/L (ref 22–29)
CREAT SERPL-MCNC: 0.69 MG/DL (ref 0.57–1)
DEPRECATED RDW RBC AUTO: 51 FL (ref 37–54)
EGFRCR SERPLBLD CKD-EPI 2021: 98.3 ML/MIN/1.73
EOSINOPHIL # BLD AUTO: 0.33 10*3/MM3 (ref 0–0.4)
EOSINOPHIL NFR BLD AUTO: 8.2 % (ref 0.3–6.2)
ERYTHROCYTE [DISTWIDTH] IN BLOOD BY AUTOMATED COUNT: 14.8 % (ref 12.3–15.4)
GLOBULIN UR ELPH-MCNC: 2.3 GM/DL
GLUCOSE SERPL-MCNC: 77 MG/DL (ref 65–99)
HCT VFR BLD AUTO: 38.6 % (ref 34–46.6)
HGB BLD-MCNC: 12.3 G/DL (ref 12–15.9)
LYMPHOCYTES # BLD AUTO: 0.89 10*3/MM3 (ref 0.7–3.1)
LYMPHOCYTES NFR BLD AUTO: 22.1 % (ref 19.6–45.3)
MCH RBC QN AUTO: 30.9 PG (ref 26.6–33)
MCHC RBC AUTO-ENTMCNC: 31.9 G/DL (ref 31.5–35.7)
MCV RBC AUTO: 97 FL (ref 79–97)
MONOCYTES # BLD AUTO: 0.52 10*3/MM3 (ref 0.1–0.9)
MONOCYTES NFR BLD AUTO: 12.9 % (ref 5–12)
NEUTROPHILS NFR BLD AUTO: 2.26 10*3/MM3 (ref 1.7–7)
NEUTROPHILS NFR BLD AUTO: 56.3 % (ref 42.7–76)
PLATELET # BLD AUTO: 282 10*3/MM3 (ref 140–450)
PMV BLD AUTO: 9.3 FL (ref 6–12)
POTASSIUM SERPL-SCNC: 4.4 MMOL/L (ref 3.5–5.2)
PROT SERPL-MCNC: 6.5 G/DL (ref 6–8.5)
RBC # BLD AUTO: 3.98 10*6/MM3 (ref 3.77–5.28)
SODIUM SERPL-SCNC: 139 MMOL/L (ref 136–145)
WBC NRBC COR # BLD AUTO: 4.02 10*3/MM3 (ref 3.4–10.8)

## 2024-05-07 PROCEDURE — 80053 COMPREHEN METABOLIC PANEL: CPT | Performed by: STUDENT IN AN ORGANIZED HEALTH CARE EDUCATION/TRAINING PROGRAM

## 2024-05-07 PROCEDURE — 82378 CARCINOEMBRYONIC ANTIGEN: CPT | Performed by: STUDENT IN AN ORGANIZED HEALTH CARE EDUCATION/TRAINING PROGRAM

## 2024-05-07 PROCEDURE — 25010000002 HEPARIN LOCK FLUSH PER 10 UNITS: Performed by: STUDENT IN AN ORGANIZED HEALTH CARE EDUCATION/TRAINING PROGRAM

## 2024-05-07 PROCEDURE — 36591 DRAW BLOOD OFF VENOUS DEVICE: CPT

## 2024-05-07 PROCEDURE — 85025 COMPLETE CBC W/AUTO DIFF WBC: CPT

## 2024-05-07 RX ORDER — SODIUM CHLORIDE 0.9 % (FLUSH) 0.9 %
20 SYRINGE (ML) INJECTION AS NEEDED
OUTPATIENT
Start: 2024-05-07

## 2024-05-07 RX ORDER — SODIUM CHLORIDE 0.9 % (FLUSH) 0.9 %
20 SYRINGE (ML) INJECTION AS NEEDED
Status: CANCELLED | OUTPATIENT
Start: 2024-05-07

## 2024-05-07 RX ORDER — HEPARIN SODIUM (PORCINE) LOCK FLUSH IV SOLN 100 UNIT/ML 100 UNIT/ML
500 SOLUTION INTRAVENOUS AS NEEDED
Status: CANCELLED | OUTPATIENT
Start: 2024-05-07

## 2024-05-07 RX ORDER — HEPARIN SODIUM (PORCINE) LOCK FLUSH IV SOLN 100 UNIT/ML 100 UNIT/ML
500 SOLUTION INTRAVENOUS AS NEEDED
Status: DISCONTINUED | OUTPATIENT
Start: 2024-05-07 | End: 2024-05-08 | Stop reason: HOSPADM

## 2024-05-07 RX ORDER — SODIUM CHLORIDE 0.9 % (FLUSH) 0.9 %
20 SYRINGE (ML) INJECTION AS NEEDED
Status: DISCONTINUED | OUTPATIENT
Start: 2024-05-07 | End: 2024-05-08 | Stop reason: HOSPADM

## 2024-05-07 RX ORDER — HEPARIN SODIUM (PORCINE) LOCK FLUSH IV SOLN 100 UNIT/ML 100 UNIT/ML
500 SOLUTION INTRAVENOUS AS NEEDED
Status: DISCONTINUED | OUTPATIENT
Start: 2024-05-07 | End: 2024-05-11 | Stop reason: HOSPADM

## 2024-05-07 RX ORDER — SODIUM CHLORIDE 0.9 % (FLUSH) 0.9 %
20 SYRINGE (ML) INJECTION AS NEEDED
Status: DISCONTINUED | OUTPATIENT
Start: 2024-05-07 | End: 2024-05-11 | Stop reason: HOSPADM

## 2024-05-07 RX ORDER — ESOMEPRAZOLE MAGNESIUM 20 MG/1
20 GRANULE, DELAYED RELEASE ORAL
COMMUNITY
Start: 2024-03-22 | End: 2024-05-07

## 2024-05-07 RX ORDER — HEPARIN SODIUM (PORCINE) LOCK FLUSH IV SOLN 100 UNIT/ML 100 UNIT/ML
500 SOLUTION INTRAVENOUS AS NEEDED
OUTPATIENT
Start: 2024-05-07

## 2024-05-07 RX ADMIN — Medication 20 ML: at 11:13

## 2024-05-07 RX ADMIN — HEPARIN 500 UNITS: 100 SYRINGE at 11:15

## 2024-06-06 ENCOUNTER — TELEPHONE (OUTPATIENT)
Dept: RADIATION ONCOLOGY | Facility: HOSPITAL | Age: 64
End: 2024-06-06
Payer: COMMERCIAL

## 2024-06-06 NOTE — TELEPHONE ENCOUNTER
Called to reschedule her follow up appt due to Dr Kendall is out of the office. She was asking if she needed to continue to come here for appts. She said she sees Dr Simmons and Wilner regularly. Spoke with the nurse, as of now she may cancel her appt. We will be available if she wants to reschedule.

## 2024-06-18 ENCOUNTER — HOSPITAL ENCOUNTER (OUTPATIENT)
Dept: ONCOLOGY | Facility: HOSPITAL | Age: 64
Discharge: HOME | End: 2024-06-18
Payer: COMMERCIAL

## 2024-06-18 DIAGNOSIS — Z45.2 ENCOUNTER FOR CARE RELATED TO VASCULAR ACCESS PORT: Primary | ICD-10-CM

## 2024-06-18 PROCEDURE — 25010000002 HEPARIN LOCK FLUSH PER 10 UNITS: Performed by: STUDENT IN AN ORGANIZED HEALTH CARE EDUCATION/TRAINING PROGRAM

## 2024-06-18 PROCEDURE — 96523 IRRIG DRUG DELIVERY DEVICE: CPT

## 2024-06-18 RX ORDER — SODIUM CHLORIDE 0.9 % (FLUSH) 0.9 %
20 SYRINGE (ML) INJECTION AS NEEDED
Status: DISCONTINUED | OUTPATIENT
Start: 2024-06-18 | End: 2024-06-22 | Stop reason: HOSPADM

## 2024-06-18 RX ORDER — HEPARIN SODIUM (PORCINE) LOCK FLUSH IV SOLN 100 UNIT/ML 100 UNIT/ML
500 SOLUTION INTRAVENOUS AS NEEDED
Status: CANCELLED | OUTPATIENT
Start: 2024-06-18

## 2024-06-18 RX ORDER — HEPARIN SODIUM (PORCINE) LOCK FLUSH IV SOLN 100 UNIT/ML 100 UNIT/ML
500 SOLUTION INTRAVENOUS AS NEEDED
Status: DISCONTINUED | OUTPATIENT
Start: 2024-06-18 | End: 2024-06-19 | Stop reason: HOSPADM

## 2024-06-18 RX ORDER — HEPARIN SODIUM (PORCINE) LOCK FLUSH IV SOLN 100 UNIT/ML 100 UNIT/ML
500 SOLUTION INTRAVENOUS AS NEEDED
Status: DISCONTINUED | OUTPATIENT
Start: 2024-06-18 | End: 2024-06-22 | Stop reason: HOSPADM

## 2024-06-18 RX ORDER — SODIUM CHLORIDE 0.9 % (FLUSH) 0.9 %
20 SYRINGE (ML) INJECTION AS NEEDED
Status: CANCELLED | OUTPATIENT
Start: 2024-06-18

## 2024-06-18 RX ORDER — SODIUM CHLORIDE 0.9 % (FLUSH) 0.9 %
20 SYRINGE (ML) INJECTION AS NEEDED
OUTPATIENT
Start: 2024-06-18

## 2024-06-18 RX ORDER — HEPARIN SODIUM (PORCINE) LOCK FLUSH IV SOLN 100 UNIT/ML 100 UNIT/ML
500 SOLUTION INTRAVENOUS AS NEEDED
OUTPATIENT
Start: 2024-06-18

## 2024-06-18 RX ORDER — SODIUM CHLORIDE 0.9 % (FLUSH) 0.9 %
20 SYRINGE (ML) INJECTION AS NEEDED
Status: DISCONTINUED | OUTPATIENT
Start: 2024-06-18 | End: 2024-06-19 | Stop reason: HOSPADM

## 2024-06-18 RX ADMIN — Medication 20 ML: at 10:56

## 2024-06-18 RX ADMIN — HEPARIN 500 UNITS: 100 SYRINGE at 10:58

## 2024-06-18 NOTE — PROGRESS NOTES
Port accessed and flushed with good blood return noted. Port flushed with saline and heparin prior to   needle removal. Pt aware of next appointment.

## 2024-07-29 NOTE — PROGRESS NOTES
HEMATOLOGY ONCOLOGY OUTPATIENT FOLLOW UP       Patient name: Xochitl Hanson  : 1960  MRN: [id number]  Primary Care Physician: Provider, No Known  Referring Physician: No ref. provider found  Reason For Consult:       History of Present Illness:  Patient is a 64 y.o. female with known diagnosis of stage III adenocarcinoma of the rectum who is presented today to establish her care.  She was previously following with Dr. Tamayo.  Her oncologic history is summarized as follows:    Patient had rectal bleeding for several months but amount had increased significantly During 2023. No symptoms of weight loss, rectal pain, or difficulty moving bowels. Previous colonoscopy completed 10 years ago ().     She underwent colonoscopy on 6/15/2023 with Dr. Horan at [facility]. Findings include a single sessile 3 mm polyp of benign appearance found in the cecum (polypectomy), and ulcerated mass with stigmata of recent bleeding of malignant appearance found in the rectum at the distance between 12 cm and 16 cm from the anus causing partial obstruction. Multiple cold forceps biopsies were performed. Pathology from the polypectomy revealed mucosal polyp with predominant benign lymphoid aggregate, negative for adenoma. Pathology from the rectal mass revealed invasive moderately differentiated adenocarcinoma with focal mucinous component. No loss of mucoid expression of MMR proteins and no evidence of deficient mismatch pair.  She was referred to Dr. Tamayo.     CT CAP on 2023 at [facility] showed no evidence of metastatic disease in the chest. Large hiatal hernia. Rectal mass with suggestion of local invasion of adjacent fat with several small but suspicious lymph nodes adjacent to the lesion. No evidence of distant metastatic disease to the liver or retroperitoneum.      She was seen in consult by Dr. Quintin Tamayo ([facility]) on 2023.  Planned to  obtain baseline labs including CBC, CMP, and CEA level   MRI of the rectum ordered to complete staging. She was referred to Dr. Fairchild for consideration of surgery.     She was seen in consult by Dr. Vannesa Fairchild ([facility]) on 6/27/2023. Rigid proctoscopy completed in the office. Tumor appeared to be in the upper rectum both on rigid proctoscopy and colonoscopy. If so, patient may be able to avoid XRT. Awaiting MRI and discussion at Multi-D Tumor Board. Discussed that if early-stage or upper rectal, may go straight to surgery versus neoadjuvant chemo.     MRI at [place]-- Not available presently.     [facility]-D Tumor Board on 7/5/2023 recommended neoadjuvant chemoradiation due to early T3a extension to muscularis propria and 5 mm lymph nodes noted on MRI. No EVMI.    7/27/23: Patient started Neoadjuvant Chemotherapy with FOLFOXIRI.  Cycle 1 was complicated by an infusion reaction to irinotecan which was managed with dexamethasone, Pepcid and Benadryl.  PEG filgrastim was added with cycle 2 FOLFOXIRI due to cytopenias.    7/27/2023: C1 D1 FOLFOXIRI  8/15/2023: C2 D1 FOLFOXIRI  10/9/2023: C6 D1 FOLFOXIRI  10/20/2023: Chemotherapy dose reduced by 20% due to thrombocytopenia.    10/27/2023: CT chest abdomen pelvis with contrast done at [facility] revealed patient's known rectal malignancy not well-visualized.  There was decreased surrounding perirectal fat stranding and no evidence of abdominopelvic metastatic disease.  CT chest was negative for any evidence of metastatic disease.    11/13/2023: C8-D1 FOLFOXIRI     11/30/2023: CT simulation for neoadjuvant radiation with concurrent 5-FU.    12/11/2023: Patient started on neoadjuvant radiation with weekly 5-FU infusion.     12/27/23: Patient presents for initial consultation today, She is undergoing neoadjuvant ChemoRT and is tolerating therapy relatively well. Denied any significant treatment related adverse effects presently, except for  fatigue. No weight/appetite changes, no Rectal bleeding. Cancer history as above.    1/8/23: patient resumed her concurrent ChemoRT at [facility].    3/20/24: CT Chest/Abdomen/Pelvis:  IMPRESSION:    1.There is a nonspecific 5 mm part solid nodule within the left upper lobe. No priors are available for direct comparison. Recommend correlation with prior outside imaging if available  2.Otherwise, no evidence of metastatic disease within the chest.   3. No evidence of metastatic disease within the abdomen.     3/21/24: MRI pelvis:  IMPRESSION:  1.Posttreatment changes in the upper rectum without definitive evidence for residual tumor at the treatment site.  2.Small perirectal fluid collection measuring 1.6 cm extends from the 12:00 position of the upper rectum into the anterior peritoneal reflection. This may represent a small abscess or a sinus tract. This collection/tract does not appear to communicate with the uterus or vagina.  3.No evidence for pelvic metastatic disease.     3/22/24: Sigmoidoscopy and Rectal Biopsy:  FINAL DIAGNOSIS:   A. Rectal scar, biopsy:   - Reactive epithelium with pools of mucin.   - Focal stromal reactive atypia, negative CK AE1/3 immunostain is supportive.   - Negative for dysplasia or malignancy.   - Multiple levels examined.   COMMENT:   The superficial nature of the biopsy precludes evaluation of deeper layers of the rectal wall.   Multiple levels examined. Clinical and endoscopic correlation is recommended.     5/7/2024:Patient seen today for follow up, She has completed neoadjuvant chemotherapy and Chemoradiation as above. She was thereafter evalauted by Dr. Fairchild and underwent rectal biopsy which did not show any evidence of residual disease. Follow up imaging including MRI also consistent with Complete response. She was not considered a candidate for LAR/curative surgery given complete response.     Patient is doing well today. She denied any significant complaints  today including any GI issues. She is interested in having her port removed. Has mild neuropathy which has improved somewhat following completion of treatment. ROS otherwise negative.     7/23/2024: MRI pelvis with without contrast:  IMPRESSION:     Since 06/26/2023, the posttreatment primary tumor and extramural disease shows: near complete response.   Post-treatment category: ymrT1/2, ymrN0   Circumferential resection margin: Not applicable   Sphincter involvement: No   Suspicious extra mesorectal lymph nodes: No     Subjective:  Patient seen today for follow-up.  She has recently undergone sigmoidoscopy and pelvic MRI at [facility] as above.  Clinically doing well overall.  Denied any acute complaints presently.  Peripheral neuropathy stable.     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 (Patient not taking: Reported on 5/7/2024), Disp: , Rfl:     DULoxetine HCl 40 MG capsule delayed-release particles, 1 capsule Daily., Disp: , Rfl:     esomeprazole (nexIUM) 40 MG capsule, Take 1 capsule by mouth Every Morning Before Breakfast., Disp: , Rfl:     ibuprofen (ADVIL,MOTRIN)  "200 MG tablet, Take 1 tablet by mouth Every 6 (Six) Hours As Needed., Disp: , Rfl:     lisinopril (PRINIVIL,ZESTRIL) 20 MG tablet, Take 1 tablet by mouth Daily., Disp: , Rfl:     methocarbamol (ROBAXIN) 750 MG tablet, Take 1 tablet by mouth Every 8 (Eight) Hours As Needed., Disp: , Rfl:     multivitamin (THERAGRAN) tablet tablet, Take 1 tablet by mouth Daily., Disp: , Rfl:     No Known Allergies    Family History   Problem Relation Age of Onset    Prostate cancer Brother        Cancer-related family history includes Prostate cancer in her brother.      Social History     Tobacco Use    Smoking status: Never    Smokeless tobacco: Never   Vaping Use    Vaping status: Never Used   Substance Use Topics    Alcohol use: Never    Drug use: Never     Social History     Social History Narrative    Not on file       ROS:   Review of Systems   Constitutional: Negative.    HENT: Negative.     Eyes: Negative.    Respiratory: Negative.     Cardiovascular: Negative.    Gastrointestinal: Negative.    Endocrine: Negative.    Genitourinary: Negative.    Musculoskeletal: Negative.    Skin: Negative.    Allergic/Immunologic: Negative.    Neurological:  Positive for numbness (bilateral hand and feet numbness).   Hematological: Negative.    Psychiatric/Behavioral: Negative.           Objective:    Vital Signs:  Vitals:    07/30/24 1059   BP: 122/82   Pulse: 84   SpO2: 94%   Weight: 83.8 kg (184 lb 12.8 oz)   Height: 167.6 cm (66\")   PainSc: 0-No pain             Body mass index is 29.83 kg/m².    ECOG  (0) Fully active, able to carry on all predisease performance without restriction    Physical Exam:   Physical Exam  Constitutional:       Appearance: Normal appearance. She is normal weight.   HENT:      Head: Normocephalic and atraumatic.      Right Ear: External ear normal.      Left Ear: External ear normal.      Nose: Nose normal.      Mouth/Throat:      Mouth: Mucous membranes are moist.      Pharynx: Oropharynx is clear.   Eyes:     "  Extraocular Movements: Extraocular movements intact.      Conjunctiva/sclera: Conjunctivae normal.      Pupils: Pupils are equal, round, and reactive to light.   Cardiovascular:      Rate and Rhythm: Normal rate and regular rhythm.      Pulses: Normal pulses.      Heart sounds: Normal heart sounds.   Pulmonary:      Effort: Pulmonary effort is normal.      Breath sounds: Normal breath sounds.   Abdominal:      General: Abdomen is flat. Bowel sounds are normal.      Palpations: Abdomen is soft.   Musculoskeletal:         General: Normal range of motion.      Cervical back: Normal range of motion and neck supple.   Skin:     General: Skin is warm.   Neurological:      Mental Status: She is alert.   Psychiatric:         Mood and Affect: Mood normal.         Behavior: Behavior normal.         Thought Content: Thought content normal.         Judgment: Judgment normal.         Lab Results - Last 18 Months   Lab Units 07/30/24  1053 05/17/24  1342 05/07/24  1115   WBC 10*3/mm3 4.22 4.54 4.02   HEMOGLOBIN g/dL 13.4 12.9 12.3   HEMATOCRIT % 41.5 39.5 38.6   PLATELETS 10*3/mm3 256 297 282   MCV fL 95.4 92.7 97.0       Lab Results - Last 18 Months   Lab Units 07/19/23  1157   INR  <0.93*   APTT seconds 23.5*       Lab Results   Component Value Date    PTT 23.5 (L) 07/19/2023    INR <0.93 (L) 07/19/2023         Assessment & Plan :      Stage III Rectal Adenocarcinoma: MSI-H  -Patient presented today for establishment of care, outside medical records, imaging findings, disease prognosis and treatment options were reviewed with the patient.  -Patient has completed Neoadjuvant Chemotherapy with FOLFOXIRI X 8 cycles with minimal adverse effects and no residual toxicities, her performance status is near baseline.  -Recent imaging on 10/27/23 as per outside records showed good treatment response.  -Patient has completed Neoadjuvant Chemo-XRT with Concurrent 5FU infusion [weekly 5FU at 275mg/m2 dose over 96 hour infusion (M-F)]. She  tolerated treatment well.  -Noted to have Radiographic and pathologic CR following treatment, patient was evaluated by Dr. Fairchild and was not thought to be candidate for definitive resection/LAR given CR and was instead recommended for surveillance.  -Patient doing well at this time. No major issues reported.  -Will proceed with Surveillance with CBC, CMP, CEA Q3 monthly and CT C/A/P every 6 months.  -Continue to follow with radiation Oncology and Dr. Fairchild ([facility] at [facility]) as planned.   -Recent sigmoidoscopy reported SONIDO.  MRI pelvis as above reported negative for recurrence.    -3-month follow-up with restaging scans and labs.  -CEA levels continue to be WNL.    Peripheral neuropathy: Grade 1, has improved following completion of treatment. Will continue to monitor. Patient is on MVI supplement which is acceptable. B12 low normal.     Macrocytic anemia: Likely Sec to Chemotherapy.  B12 low normal, Folate WNL.  Iron panel showed features of ACD with likely YRN component. Will monitor and consider Iron infusions as needed. Hb/Hct near normal.    Port: Discussed with pt regarding pros and cons of port removal. She is agreeable to keeping port in place for now. Plan for port flush Q6 weekly.    CBC, CMP and CEA levels WNL today.    Follow up in  3 months with labs and scans. Sooner as needed.      Thank you very much for providing the opportunity to participate in this patient’s care. Please do not hesitate to call if there are any other questions.

## 2024-07-30 ENCOUNTER — HOSPITAL ENCOUNTER (OUTPATIENT)
Dept: ONCOLOGY | Facility: HOSPITAL | Age: 64
Discharge: HOME | End: 2024-07-30
Admitting: STUDENT IN AN ORGANIZED HEALTH CARE EDUCATION/TRAINING PROGRAM
Payer: COMMERCIAL

## 2024-07-30 ENCOUNTER — OFFICE VISIT (OUTPATIENT)
Dept: ONCOLOGY | Facility: CLINIC | Age: 64
End: 2024-07-30
Payer: COMMERCIAL

## 2024-07-30 VITALS
HEIGHT: 66 IN | DIASTOLIC BLOOD PRESSURE: 82 MMHG | BODY MASS INDEX: 29.7 KG/M2 | WEIGHT: 184.8 LBS | OXYGEN SATURATION: 94 % | HEART RATE: 84 BPM | SYSTOLIC BLOOD PRESSURE: 122 MMHG

## 2024-07-30 DIAGNOSIS — G62.9 NEUROPATHY: ICD-10-CM

## 2024-07-30 DIAGNOSIS — Z45.2 ENCOUNTER FOR CARE RELATED TO VASCULAR ACCESS PORT: Primary | ICD-10-CM

## 2024-07-30 DIAGNOSIS — D64.9 ANEMIA, UNSPECIFIED TYPE: ICD-10-CM

## 2024-07-30 DIAGNOSIS — D50.9 IRON DEFICIENCY ANEMIA, UNSPECIFIED IRON DEFICIENCY ANEMIA TYPE: ICD-10-CM

## 2024-07-30 DIAGNOSIS — C20 RECTAL CANCER: Primary | ICD-10-CM

## 2024-07-30 DIAGNOSIS — C20 RECTAL CANCER: ICD-10-CM

## 2024-07-30 LAB
BASOPHILS # BLD AUTO: 0.01 10*3/MM3 (ref 0–0.2)
BASOPHILS NFR BLD AUTO: 0.2 % (ref 0–1.5)
CEA SERPL-MCNC: 1.65 NG/ML
DEPRECATED RDW RBC AUTO: 48.4 FL (ref 37–54)
EOSINOPHIL # BLD AUTO: 0.28 10*3/MM3 (ref 0–0.4)
EOSINOPHIL NFR BLD AUTO: 6.6 % (ref 0.3–6.2)
ERYTHROCYTE [DISTWIDTH] IN BLOOD BY AUTOMATED COUNT: 14.2 % (ref 12.3–15.4)
FERRITIN SERPL-MCNC: 34.1 NG/ML (ref 13–150)
HCT VFR BLD AUTO: 41.5 % (ref 34–46.6)
HGB BLD-MCNC: 13.4 G/DL (ref 12–15.9)
HGB RETIC QN AUTO: 33.2 PG (ref 29.8–36.1)
IMM RETICS NFR: 11.8 % (ref 3–15.8)
IRON 24H UR-MRATE: 142 MCG/DL (ref 37–145)
IRON SATN MFR SERPL: 32 % (ref 20–50)
LYMPHOCYTES # BLD AUTO: 0.79 10*3/MM3 (ref 0.7–3.1)
LYMPHOCYTES NFR BLD AUTO: 18.7 % (ref 19.6–45.3)
MCH RBC QN AUTO: 30.8 PG (ref 26.6–33)
MCHC RBC AUTO-ENTMCNC: 32.3 G/DL (ref 31.5–35.7)
MCV RBC AUTO: 95.4 FL (ref 79–97)
MONOCYTES # BLD AUTO: 0.54 10*3/MM3 (ref 0.1–0.9)
MONOCYTES NFR BLD AUTO: 12.8 % (ref 5–12)
NEUTROPHILS NFR BLD AUTO: 2.6 10*3/MM3 (ref 1.7–7)
NEUTROPHILS NFR BLD AUTO: 61.7 % (ref 42.7–76)
PLATELET # BLD AUTO: 256 10*3/MM3 (ref 140–450)
PMV BLD AUTO: 9.4 FL (ref 6–12)
RBC # BLD AUTO: 4.35 10*6/MM3 (ref 3.77–5.28)
RETICS # AUTO: 0.06 10*6/MM3 (ref 0.02–0.13)
RETICS/RBC NFR AUTO: 1.35 % (ref 0.7–1.9)
TIBC SERPL-MCNC: 441 MCG/DL (ref 298–536)
TRANSFERRIN SERPL-MCNC: 296 MG/DL (ref 200–360)
WBC NRBC COR # BLD AUTO: 4.22 10*3/MM3 (ref 3.4–10.8)

## 2024-07-30 PROCEDURE — 84466 ASSAY OF TRANSFERRIN: CPT | Performed by: STUDENT IN AN ORGANIZED HEALTH CARE EDUCATION/TRAINING PROGRAM

## 2024-07-30 PROCEDURE — 82728 ASSAY OF FERRITIN: CPT | Performed by: STUDENT IN AN ORGANIZED HEALTH CARE EDUCATION/TRAINING PROGRAM

## 2024-07-30 PROCEDURE — 83540 ASSAY OF IRON: CPT | Performed by: STUDENT IN AN ORGANIZED HEALTH CARE EDUCATION/TRAINING PROGRAM

## 2024-07-30 PROCEDURE — 36591 DRAW BLOOD OFF VENOUS DEVICE: CPT

## 2024-07-30 PROCEDURE — 25010000002 HEPARIN LOCK FLUSH PER 10 UNITS: Performed by: STUDENT IN AN ORGANIZED HEALTH CARE EDUCATION/TRAINING PROGRAM

## 2024-07-30 PROCEDURE — 82378 CARCINOEMBRYONIC ANTIGEN: CPT | Performed by: STUDENT IN AN ORGANIZED HEALTH CARE EDUCATION/TRAINING PROGRAM

## 2024-07-30 PROCEDURE — 85046 RETICYTE/HGB CONCENTRATE: CPT | Performed by: STUDENT IN AN ORGANIZED HEALTH CARE EDUCATION/TRAINING PROGRAM

## 2024-07-30 PROCEDURE — 85025 COMPLETE CBC W/AUTO DIFF WBC: CPT | Performed by: STUDENT IN AN ORGANIZED HEALTH CARE EDUCATION/TRAINING PROGRAM

## 2024-07-30 RX ORDER — ALPRAZOLAM 0.5 MG/1
0.5 TABLET ORAL 2 TIMES DAILY PRN
COMMUNITY

## 2024-07-30 RX ORDER — HEPARIN SODIUM (PORCINE) LOCK FLUSH IV SOLN 100 UNIT/ML 100 UNIT/ML
500 SOLUTION INTRAVENOUS AS NEEDED
Status: DISCONTINUED | OUTPATIENT
Start: 2024-07-30 | End: 2024-07-31 | Stop reason: HOSPADM

## 2024-07-30 RX ORDER — CALCIUM CARBONATE/VITAMIN D3 500MG-5MCG
TABLET ORAL
COMMUNITY
Start: 2024-07-12

## 2024-07-30 RX ORDER — HYDROCODONE BITARTRATE AND ACETAMINOPHEN 10; 325 MG/1; MG/1
TABLET ORAL
COMMUNITY

## 2024-07-30 RX ORDER — SODIUM CHLORIDE 0.9 % (FLUSH) 0.9 %
20 SYRINGE (ML) INJECTION AS NEEDED
OUTPATIENT
Start: 2024-07-30

## 2024-07-30 RX ORDER — CYCLOBENZAPRINE HCL 10 MG
TABLET ORAL
COMMUNITY
Start: 2024-07-16

## 2024-07-30 RX ORDER — HEPARIN SODIUM (PORCINE) LOCK FLUSH IV SOLN 100 UNIT/ML 100 UNIT/ML
500 SOLUTION INTRAVENOUS AS NEEDED
OUTPATIENT
Start: 2024-07-30

## 2024-07-30 RX ORDER — NICOTINE POLACRILEX 2 MG
GUM BUCCAL
COMMUNITY
Start: 2024-07-12

## 2024-07-30 RX ORDER — MELOXICAM 15 MG/1
TABLET ORAL
COMMUNITY
Start: 2024-07-16

## 2024-07-30 RX ORDER — SODIUM CHLORIDE 0.9 % (FLUSH) 0.9 %
20 SYRINGE (ML) INJECTION AS NEEDED
Status: DISCONTINUED | OUTPATIENT
Start: 2024-07-30 | End: 2024-07-31 | Stop reason: HOSPADM

## 2024-07-30 RX ADMIN — Medication 20 ML: at 10:51

## 2024-07-30 RX ADMIN — HEPARIN 500 UNITS: 100 SYRINGE at 10:53

## 2024-07-30 NOTE — PROGRESS NOTES
Port accessed and flushed with good blood return noted. Labs drawn from port as ordered.Port flushed with saline and heparin prior to needle removal.

## 2024-09-10 ENCOUNTER — HOSPITAL ENCOUNTER (OUTPATIENT)
Dept: ONCOLOGY | Facility: HOSPITAL | Age: 64
Discharge: HOME | End: 2024-09-10
Admitting: STUDENT IN AN ORGANIZED HEALTH CARE EDUCATION/TRAINING PROGRAM
Payer: COMMERCIAL

## 2024-09-10 DIAGNOSIS — Z45.2 ENCOUNTER FOR CARE RELATED TO VASCULAR ACCESS PORT: Primary | ICD-10-CM

## 2024-09-10 DIAGNOSIS — C20 RECTAL CANCER: ICD-10-CM

## 2024-09-10 LAB
ALBUMIN SERPL-MCNC: 4.3 G/DL (ref 3.5–5.2)
ALBUMIN/GLOB SERPL: 1.9 G/DL
ALP SERPL-CCNC: 110 U/L (ref 39–117)
ALT SERPL W P-5'-P-CCNC: 17 U/L (ref 1–33)
ANION GAP SERPL CALCULATED.3IONS-SCNC: 10.5 MMOL/L (ref 5–15)
AST SERPL-CCNC: 18 U/L (ref 1–32)
BASOPHILS # BLD AUTO: 0.04 10*3/MM3 (ref 0–0.2)
BASOPHILS NFR BLD AUTO: 0.7 % (ref 0–1.5)
BILIRUB SERPL-MCNC: 0.7 MG/DL (ref 0–1.2)
BUN SERPL-MCNC: 20 MG/DL (ref 8–23)
BUN/CREAT SERPL: 25 (ref 7–25)
CALCIUM SPEC-SCNC: 9.1 MG/DL (ref 8.6–10.5)
CEA SERPL-MCNC: 1.29 NG/ML
CHLORIDE SERPL-SCNC: 107 MMOL/L (ref 98–107)
CO2 SERPL-SCNC: 23.5 MMOL/L (ref 22–29)
CREAT SERPL-MCNC: 0.8 MG/DL (ref 0.57–1)
DEPRECATED RDW RBC AUTO: 49.7 FL (ref 37–54)
EGFRCR SERPLBLD CKD-EPI 2021: 83.4 ML/MIN/1.73
EOSINOPHIL # BLD AUTO: 0.22 10*3/MM3 (ref 0–0.4)
EOSINOPHIL NFR BLD AUTO: 4 % (ref 0.3–6.2)
ERYTHROCYTE [DISTWIDTH] IN BLOOD BY AUTOMATED COUNT: 14.6 % (ref 12.3–15.4)
GLOBULIN UR ELPH-MCNC: 2.3 GM/DL
GLUCOSE SERPL-MCNC: 122 MG/DL (ref 65–99)
HCT VFR BLD AUTO: 39.2 % (ref 34–46.6)
HGB BLD-MCNC: 12.7 G/DL (ref 12–15.9)
LYMPHOCYTES # BLD AUTO: 1.1 10*3/MM3 (ref 0.7–3.1)
LYMPHOCYTES NFR BLD AUTO: 19.9 % (ref 19.6–45.3)
MCH RBC QN AUTO: 31.3 PG (ref 26.6–33)
MCHC RBC AUTO-ENTMCNC: 32.4 G/DL (ref 31.5–35.7)
MCV RBC AUTO: 96.6 FL (ref 79–97)
MONOCYTES # BLD AUTO: 0.47 10*3/MM3 (ref 0.1–0.9)
MONOCYTES NFR BLD AUTO: 8.5 % (ref 5–12)
NEUTROPHILS NFR BLD AUTO: 3.69 10*3/MM3 (ref 1.7–7)
NEUTROPHILS NFR BLD AUTO: 66.9 % (ref 42.7–76)
PLATELET # BLD AUTO: 276 10*3/MM3 (ref 140–450)
PMV BLD AUTO: 9.3 FL (ref 6–12)
POTASSIUM SERPL-SCNC: 4.1 MMOL/L (ref 3.5–5.2)
PROT SERPL-MCNC: 6.6 G/DL (ref 6–8.5)
RBC # BLD AUTO: 4.06 10*6/MM3 (ref 3.77–5.28)
SODIUM SERPL-SCNC: 141 MMOL/L (ref 136–145)
WBC NRBC COR # BLD AUTO: 5.52 10*3/MM3 (ref 3.4–10.8)

## 2024-09-10 PROCEDURE — 80053 COMPREHEN METABOLIC PANEL: CPT | Performed by: STUDENT IN AN ORGANIZED HEALTH CARE EDUCATION/TRAINING PROGRAM

## 2024-09-10 PROCEDURE — 25010000002 HEPARIN LOCK FLUSH PER 10 UNITS: Performed by: STUDENT IN AN ORGANIZED HEALTH CARE EDUCATION/TRAINING PROGRAM

## 2024-09-10 PROCEDURE — 36591 DRAW BLOOD OFF VENOUS DEVICE: CPT

## 2024-09-10 PROCEDURE — 85025 COMPLETE CBC W/AUTO DIFF WBC: CPT | Performed by: STUDENT IN AN ORGANIZED HEALTH CARE EDUCATION/TRAINING PROGRAM

## 2024-09-10 PROCEDURE — 82378 CARCINOEMBRYONIC ANTIGEN: CPT | Performed by: STUDENT IN AN ORGANIZED HEALTH CARE EDUCATION/TRAINING PROGRAM

## 2024-09-10 RX ORDER — HEPARIN SODIUM (PORCINE) LOCK FLUSH IV SOLN 100 UNIT/ML 100 UNIT/ML
500 SOLUTION INTRAVENOUS AS NEEDED
OUTPATIENT
Start: 2024-09-10

## 2024-09-10 RX ORDER — SODIUM CHLORIDE 0.9 % (FLUSH) 0.9 %
20 SYRINGE (ML) INJECTION AS NEEDED
OUTPATIENT
Start: 2024-09-10

## 2024-09-10 RX ORDER — HEPARIN SODIUM (PORCINE) LOCK FLUSH IV SOLN 100 UNIT/ML 100 UNIT/ML
500 SOLUTION INTRAVENOUS AS NEEDED
Status: DISCONTINUED | OUTPATIENT
Start: 2024-09-10 | End: 2024-09-11 | Stop reason: HOSPADM

## 2024-09-10 RX ORDER — SODIUM CHLORIDE 0.9 % (FLUSH) 0.9 %
20 SYRINGE (ML) INJECTION AS NEEDED
Status: DISCONTINUED | OUTPATIENT
Start: 2024-09-10 | End: 2024-09-11 | Stop reason: HOSPADM

## 2024-09-10 RX ADMIN — HEPARIN 500 UNITS: 100 SYRINGE at 11:45

## 2024-09-10 RX ADMIN — Medication 20 ML: at 11:43

## 2024-09-10 NOTE — PROGRESS NOTES
Port accessed and flushed with good blood return noted.Labs drawn from port Port flushed with saline and heparin prior to needle removal. Pt aware of next appointment.

## 2024-10-29 NOTE — PROGRESS NOTES
HEMATOLOGY ONCOLOGY OUTPATIENT FOLLOW UP       Patient name: Xochitl Hanson  : 1960  MRN: [id number]  Primary Care Physician: Eliza Lemus APRN  Referring Physician: No ref. provider found  Reason For Consult:       History of Present Illness:  Patient is a 64 y.o. female with known diagnosis of stage III adenocarcinoma of the rectum who is presented today to establish her care.  She was previously following with Dr. Tamayo.  Her oncologic history is summarized as follows:    Patient had rectal bleeding for several months but amount had increased significantly During 2023. No symptoms of weight loss, rectal pain, or difficulty moving bowels. Previous colonoscopy completed 10 years ago ().     She underwent colonoscopy on 6/15/2023 with Dr. Horan at [facility]. Findings include a single sessile 3 mm polyp of benign appearance found in the cecum (polypectomy), and ulcerated mass with stigmata of recent bleeding of malignant appearance found in the rectum at the distance between 12 cm and 16 cm from the anus causing partial obstruction. Multiple cold forceps biopsies were performed. Pathology from the polypectomy revealed mucosal polyp with predominant benign lymphoid aggregate, negative for adenoma. Pathology from the rectal mass revealed invasive moderately differentiated adenocarcinoma with focal mucinous component. No loss of mucoid expression of MMR proteins and no evidence of deficient mismatch pair.  She was referred to Dr. Tamayo.     CT CAP on 2023 at [facility] showed no evidence of metastatic disease in the chest. Large hiatal hernia. Rectal mass with suggestion of local invasion of adjacent fat with several small but suspicious lymph nodes adjacent to the lesion. No evidence of distant metastatic disease to the liver or retroperitoneum.      She was seen in consult by Dr. Quintin Tamayo ([facility]) on 2023.   Planned to obtain baseline labs including CBC, CMP, and CEA level   MRI of the rectum ordered to complete staging. She was referred to Dr. Fairchild for consideration of surgery.     She was seen in consult by Dr. Vannesa Fairchild ([facility]) on 6/27/2023. Rigid proctoscopy completed in the office. Tumor appeared to be in the upper rectum both on rigid proctoscopy and colonoscopy. If so, patient may be able to avoid XRT. Awaiting MRI and discussion at Multi-D Tumor Board. Discussed that if early-stage or upper rectal, may go straight to surgery versus neoadjuvant chemo.     MRI at [place]-- Not available presently.     [facility] Multi-D Tumor Board on 7/5/2023 recommended neoadjuvant chemoradiation due to early T3a extension to muscularis propria and 5 mm lymph nodes noted on MRI. No EVMI.    7/27/23: Patient started Neoadjuvant Chemotherapy with FOLFOXIRI.  Cycle 1 was complicated by an infusion reaction to irinotecan which was managed with dexamethasone, Pepcid and Benadryl.  PEG filgrastim was added with cycle 2 FOLFOXIRI due to cytopenias.    7/27/2023: C1 D1 FOLFOXIRI  8/15/2023: C2 D1 FOLFOXIRI  10/9/2023: C6 D1 FOLFOXIRI  10/20/2023: Chemotherapy dose reduced by 20% due to thrombocytopenia.    10/27/2023: CT chest abdomen pelvis with contrast done at [facility] revealed patient's known rectal malignancy not well-visualized.  There was decreased surrounding perirectal fat stranding and no evidence of abdominopelvic metastatic disease.  CT chest was negative for any evidence of metastatic disease.    11/13/2023: C8-D1 FOLFOXIRI     11/30/2023: CT simulation for neoadjuvant radiation with concurrent 5-FU.    12/11/2023: Patient started on neoadjuvant radiation with weekly 5-FU infusion.     12/27/23: Patient presents for initial consultation today, She is undergoing neoadjuvant ChemoRT and is tolerating therapy relatively well. Denied any significant treatment related adverse effects presently,  except for fatigue. No weight/appetite changes, no Rectal bleeding. Cancer history as above.    1/8/23: patient resumed her concurrent ChemoRT at [facility].    3/20/24: CT Chest/Abdomen/Pelvis:  IMPRESSION:    1.There is a nonspecific 5 mm part solid nodule within the left upper lobe. No priors are available for direct comparison. Recommend correlation with prior outside imaging if available  2.Otherwise, no evidence of metastatic disease within the chest.   3. No evidence of metastatic disease within the abdomen.     3/21/24: MRI pelvis:  IMPRESSION:  1.Posttreatment changes in the upper rectum without definitive evidence for residual tumor at the treatment site.  2.Small perirectal fluid collection measuring 1.6 cm extends from the 12:00 position of the upper rectum into the anterior peritoneal reflection. This may represent a small abscess or a sinus tract. This collection/tract does not appear to communicate with the uterus or vagina.  3.No evidence for pelvic metastatic disease.     3/22/24: Sigmoidoscopy and Rectal Biopsy:  FINAL DIAGNOSIS:   A. Rectal scar, biopsy:   - Reactive epithelium with pools of mucin.   - Focal stromal reactive atypia, negative CK AE1/3 immunostain is supportive.   - Negative for dysplasia or malignancy.   - Multiple levels examined.   COMMENT:   The superficial nature of the biopsy precludes evaluation of deeper layers of the rectal wall.   Multiple levels examined. Clinical and endoscopic correlation is recommended.     5/7/2024:Patient seen today for follow up, She has completed neoadjuvant chemotherapy and Chemoradiation as above. She was thereafter evalauted by Dr. Fairchild and underwent rectal biopsy which did not show any evidence of residual disease. Follow up imaging including MRI also consistent with Complete response. She was not considered a candidate for LAR/curative surgery given complete response.     Patient is doing well today. She denied any significant  complaints today including any GI issues. She is interested in having her port removed. Has mild neuropathy which has improved somewhat following completion of treatment. ROS otherwise negative.     7/23/2024: MRI pelvis with without contrast:  IMPRESSION:     Since 06/26/2023, the posttreatment primary tumor and extramural disease shows: near complete response.   Post-treatment category: ymrT1/2, ymrN0   Circumferential resection margin: Not applicable   Sphincter involvement: No   Suspicious extra mesorectal lymph nodes: No     10/25/2024:Rectal scar, biopsy:   - At least intra-mucosal carcinoma, suspicious to represent recurrent invasive adenocarcinoma   Comment: It is not possible to make a definitive diagnosis of invasive adenocarcinoma because the sample   is markedly fragmented, the dysplastic glands observed are associated with necroinflammatory   debris and granulation tissue.  In addition, there is no definitive desmoplasia seen. In the   context of prior rectal cancer, the findings are suspicious to represent recurrent cancer.   Correlation with endoscopic findings is recommended.     Subjective:  10/30/2024: Patient seen today for follow-up.  Recent colonoscopy on 10/25/2024 was reported positive for recurrent disease as above.  She has been recommended for LAR by Dr. Damian in view of this. Patient appears somewhat distressed due to having to undergo surgery. But denied any abdominopelvic or other new signs/symptoms.      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    Rectal cancer 06/27/2023       Past Surgical History:   Procedure Laterality Date    ENDOMETRIAL ABLATION      TUBAL ABDOMINAL LIGATION           Current Outpatient Medications:     ALPRAZolam (XANAX) 0.5 MG tablet, Take 1 tablet by mouth 2 (Two) Times a Day As Needed., Disp: , Rfl:     Biotin 1 MG capsule, 1 Tab, Oral, Daily, 0 Refill(s), Disp: , Rfl:     Calcium Carb-Cholecalciferol (Calcium 500 + D) 500-5 MG-MCG tablet per tablet, 1 Tab, Chew, BID, 0 Refill(s), Disp: , Rfl:     cyclobenzaprine (FLEXERIL) 10 MG tablet, TAKE 1 TABLET BY MOUTH THREE TIMES A DAY\ AS NEEDED FOR MUSCLE SPASMS, Disp: , Rfl:     DULoxetine HCl 40 MG capsule delayed-release particles, 1 capsule Daily., Disp: , Rfl:     esomeprazole (nexIUM) 40 MG capsule, Take 1 capsule by mouth Every Morning Before Breakfast., Disp: , Rfl:     HYDROcodone-acetaminophen (NORCO)  MG per tablet, TAKE 1 TABLET BY MOUTH THREE TIMES A DAY AS NEEDED FOR 7 DAYS, Disp: , Rfl:     ibuprofen (ADVIL,MOTRIN) 200 MG tablet, Take 1 tablet by mouth Every 6 (Six) Hours As Needed. (Patient not taking: Reported on 7/30/2024), Disp: , Rfl:     lisinopril (PRINIVIL,ZESTRIL) 20 MG tablet, Take 1 tablet by mouth Daily., Disp: , Rfl:     meloxicam (MOBIC) 15 MG tablet, TAKE 1 TABLET EVERY DAY BY ORAL ROUTE AS NEEDED, FOR BACK PAIN., Disp: , Rfl:     multivitamin (THERAGRAN) tablet tablet, Take 1 tablet by mouth Daily., Disp: , Rfl:     No Known Allergies    Family History   Problem Relation Age of Onset    Prostate cancer Brother        Cancer-related family history includes Prostate cancer in her brother.      Social History     Tobacco Use    Smoking status: Never    Smokeless tobacco: Never   Vaping Use    Vaping status: Never Used   Substance Use Topics    Alcohol use: Never    Drug use: Never     Social History     Social History  "Narrative    Not on file       ROS:   Review of Systems   Constitutional: Negative.    HENT: Negative.     Eyes: Negative.    Respiratory: Negative.     Cardiovascular: Negative.    Gastrointestinal: Negative.    Endocrine: Negative.    Genitourinary: Negative.    Musculoskeletal: Negative.    Skin: Negative.    Allergic/Immunologic: Negative.    Neurological:  Positive for numbness (bilateral hand and feet numbness).   Hematological: Negative.    Psychiatric/Behavioral: Negative.           Objective:    Vital Signs:  Vitals:    10/30/24 1019   BP: 132/83   Pulse: 83   Temp: 98.1 °F (36.7 °C)   TempSrc: Oral   SpO2: 96%   Weight: 84.3 kg (185 lb 12.8 oz)   Height: 167.6 cm (65.98\")   PainSc: 0-No pain               Body mass index is 30 kg/m².    ECOG  (0) Fully active, able to carry on all predisease performance without restriction    Physical Exam:   Physical Exam  Constitutional:       Appearance: Normal appearance. She is normal weight.   HENT:      Head: Normocephalic and atraumatic.      Right Ear: External ear normal.      Left Ear: External ear normal.      Nose: Nose normal.      Mouth/Throat:      Mouth: Mucous membranes are moist.      Pharynx: Oropharynx is clear.   Eyes:      Extraocular Movements: Extraocular movements intact.      Conjunctiva/sclera: Conjunctivae normal.      Pupils: Pupils are equal, round, and reactive to light.   Cardiovascular:      Rate and Rhythm: Normal rate and regular rhythm.      Pulses: Normal pulses.      Heart sounds: Normal heart sounds.   Pulmonary:      Effort: Pulmonary effort is normal.      Breath sounds: Normal breath sounds.   Abdominal:      General: Abdomen is flat. Bowel sounds are normal.      Palpations: Abdomen is soft.   Musculoskeletal:         General: Normal range of motion.      Cervical back: Normal range of motion and neck supple.   Skin:     General: Skin is warm.   Neurological:      Mental Status: She is alert.   Psychiatric:         Mood and " Affect: Mood normal.         Behavior: Behavior normal.         Thought Content: Thought content normal.         Judgment: Judgment normal.         Lab Results - Last 18 Months   Lab Units 10/30/24  1021 09/10/24  1145 07/30/24  1053   WBC 10*3/mm3 4.93 5.52 4.22   HEMOGLOBIN g/dL 14.2 12.7 13.4   HEMATOCRIT % 43.1 39.2 41.5   PLATELETS 10*3/mm3 284 276 256   MCV fL 96.4 96.6 95.4       Lab Results - Last 18 Months   Lab Units 07/19/23  1157   INR  <0.93*   APTT seconds 23.5*       Lab Results   Component Value Date    PTT 23.5 (L) 07/19/2023    INR <0.93 (L) 07/19/2023         Assessment & Plan :      Stage III Rectal Adenocarcinoma: MSI-H: pathCR with BECK:  Suspected local Recurrence:  -Patient presented today for establishment of care, outside medical records, imaging findings, disease prognosis and treatment options were reviewed with the patient.  -Patient has completed Neoadjuvant Chemotherapy with FOLFOXIRI X 8 cycles with minimal adverse effects and no residual toxicities, her performance status is near baseline.  -Recent imaging on 10/27/23 as per outside records showed good treatment response.  -Patient has completed Neoadjuvant Chemo-XRT with Concurrent 5FU infusion [weekly 5FU at 275mg/m2 dose over 96 hour infusion (M-F)]. She tolerated treatment well.  -Noted to have Radiographic and pathologic CR following treatment, patient was evaluated by Dr. Fairchild and was not thought to be candidate for definitive resection/LAR given CR and was instead recommended for surveillance.  -Patient doing well at this time. No major issues reported.  -Will proceed with Surveillance with CBC, CMP, CEA Q3 monthly and CT C/A/P every 6 months.  -Continue to follow with radiation Oncology and Dr. Fairchild (CRS at [facility]) as planned.   -Recent sigmoidoscopy reported SONIDO.  MRI pelvis as above reported negative for recurrence.    -noted to have local Rectal recurrence of malignancy on repeat Sigmoidoscopy. She is planned for  surgical resection in view of these findings.  -Will plan for CT Chest/Abd/Pelvis w contrast in 1-2 weeks for staging workup.   -6 week follow up with labs.  -if noted to have more extensive/Invasive disease, can consider  additional chemotherapy with Xeloda for 6 months.    Peripheral neuropathy: Grade 1, has improved following completion of treatment. Will continue to monitor. Patient is on MVI supplement which is acceptable. B12 low normal.  Symptoms have resolved.    Macrocytic anemia: Likely Sec to Chemotherapy.  B12 low normal, Folate WNL.  Iron panel showed features of ACD with likely YRN component. Will monitor and consider Iron infusions as needed. Hb/Hct near normal.    Port: Discussed with pt regarding pros and cons of port removal. She is agreeable to keeping port in place for now. Plan for port flush Q6 weekly.      Follow up in 6 weeks with labs and scans. Sooner as needed.      Thank you very much for providing the opportunity to participate in this patient’s care. Please do not hesitate to call if there are any other questions.

## 2024-10-30 ENCOUNTER — OFFICE VISIT (OUTPATIENT)
Dept: ONCOLOGY | Facility: CLINIC | Age: 64
End: 2024-10-30
Payer: COMMERCIAL

## 2024-10-30 ENCOUNTER — LAB (OUTPATIENT)
Dept: LAB | Facility: HOSPITAL | Age: 64
End: 2024-10-30
Payer: COMMERCIAL

## 2024-10-30 VITALS
OXYGEN SATURATION: 96 % | HEIGHT: 66 IN | HEART RATE: 83 BPM | DIASTOLIC BLOOD PRESSURE: 83 MMHG | SYSTOLIC BLOOD PRESSURE: 132 MMHG | BODY MASS INDEX: 29.86 KG/M2 | WEIGHT: 185.8 LBS | TEMPERATURE: 98.1 F

## 2024-10-30 DIAGNOSIS — D64.9 ANEMIA, UNSPECIFIED TYPE: ICD-10-CM

## 2024-10-30 DIAGNOSIS — C20 RECTAL CANCER: Primary | ICD-10-CM

## 2024-10-30 DIAGNOSIS — C20 RECTAL CANCER: ICD-10-CM

## 2024-10-30 DIAGNOSIS — G62.9 NEUROPATHY: ICD-10-CM

## 2024-10-30 LAB
ALBUMIN SERPL-MCNC: 4.5 G/DL (ref 3.5–5.2)
ALBUMIN/GLOB SERPL: 1.7 G/DL
ALP SERPL-CCNC: 110 U/L (ref 39–117)
ALT SERPL W P-5'-P-CCNC: 16 U/L (ref 1–33)
ANION GAP SERPL CALCULATED.3IONS-SCNC: 8.9 MMOL/L (ref 5–15)
AST SERPL-CCNC: 17 U/L (ref 1–32)
BASOPHILS # BLD AUTO: 0.04 10*3/MM3 (ref 0–0.2)
BASOPHILS NFR BLD AUTO: 0.8 % (ref 0–1.5)
BILIRUB SERPL-MCNC: 0.4 MG/DL (ref 0–1.2)
BUN SERPL-MCNC: 17 MG/DL (ref 8–23)
BUN/CREAT SERPL: 19.8 (ref 7–25)
CALCIUM SPEC-SCNC: 10.1 MG/DL (ref 8.6–10.5)
CEA SERPL-MCNC: 1.51 NG/ML
CHLORIDE SERPL-SCNC: 104 MMOL/L (ref 98–107)
CO2 SERPL-SCNC: 28.1 MMOL/L (ref 22–29)
CREAT SERPL-MCNC: 0.86 MG/DL (ref 0.57–1)
DEPRECATED RDW RBC AUTO: 46.1 FL (ref 37–54)
EGFRCR SERPLBLD CKD-EPI 2021: 76.5 ML/MIN/1.73
EOSINOPHIL # BLD AUTO: 0.3 10*3/MM3 (ref 0–0.4)
EOSINOPHIL NFR BLD AUTO: 6.1 % (ref 0.3–6.2)
ERYTHROCYTE [DISTWIDTH] IN BLOOD BY AUTOMATED COUNT: 13.3 % (ref 12.3–15.4)
GLOBULIN UR ELPH-MCNC: 2.7 GM/DL
GLUCOSE SERPL-MCNC: 80 MG/DL (ref 65–99)
HCT VFR BLD AUTO: 43.1 % (ref 34–46.6)
HGB BLD-MCNC: 14.2 G/DL (ref 12–15.9)
LYMPHOCYTES # BLD AUTO: 1.06 10*3/MM3 (ref 0.7–3.1)
LYMPHOCYTES NFR BLD AUTO: 21.5 % (ref 19.6–45.3)
MCH RBC QN AUTO: 31.8 PG (ref 26.6–33)
MCHC RBC AUTO-ENTMCNC: 32.9 G/DL (ref 31.5–35.7)
MCV RBC AUTO: 96.4 FL (ref 79–97)
MONOCYTES # BLD AUTO: 0.52 10*3/MM3 (ref 0.1–0.9)
MONOCYTES NFR BLD AUTO: 10.5 % (ref 5–12)
NEUTROPHILS NFR BLD AUTO: 3.01 10*3/MM3 (ref 1.7–7)
NEUTROPHILS NFR BLD AUTO: 61.1 % (ref 42.7–76)
PLATELET # BLD AUTO: 284 10*3/MM3 (ref 140–450)
PMV BLD AUTO: 8.6 FL (ref 6–12)
POTASSIUM SERPL-SCNC: 4.9 MMOL/L (ref 3.5–5.2)
PROT SERPL-MCNC: 7.2 G/DL (ref 6–8.5)
RBC # BLD AUTO: 4.47 10*6/MM3 (ref 3.77–5.28)
SODIUM SERPL-SCNC: 141 MMOL/L (ref 136–145)
WBC NRBC COR # BLD AUTO: 4.93 10*3/MM3 (ref 3.4–10.8)

## 2024-10-30 PROCEDURE — 82378 CARCINOEMBRYONIC ANTIGEN: CPT | Performed by: STUDENT IN AN ORGANIZED HEALTH CARE EDUCATION/TRAINING PROGRAM

## 2024-10-30 PROCEDURE — 85025 COMPLETE CBC W/AUTO DIFF WBC: CPT

## 2024-10-30 PROCEDURE — 36415 COLL VENOUS BLD VENIPUNCTURE: CPT

## 2024-10-30 PROCEDURE — 80053 COMPREHEN METABOLIC PANEL: CPT | Performed by: STUDENT IN AN ORGANIZED HEALTH CARE EDUCATION/TRAINING PROGRAM

## 2024-11-05 ENCOUNTER — HOSPITAL ENCOUNTER (OUTPATIENT)
Dept: PET IMAGING | Facility: HOSPITAL | Age: 64
Discharge: HOME | End: 2024-11-05
Admitting: STUDENT IN AN ORGANIZED HEALTH CARE EDUCATION/TRAINING PROGRAM
Payer: COMMERCIAL

## 2024-11-05 DIAGNOSIS — C20 RECTAL CANCER: ICD-10-CM

## 2024-11-05 PROCEDURE — 71260 CT THORAX DX C+: CPT

## 2024-11-05 PROCEDURE — 74177 CT ABD & PELVIS W/CONTRAST: CPT

## 2024-11-05 PROCEDURE — 25510000001 IOPAMIDOL PER 1 ML: Performed by: STUDENT IN AN ORGANIZED HEALTH CARE EDUCATION/TRAINING PROGRAM

## 2024-11-05 RX ORDER — IOPAMIDOL 755 MG/ML
100 INJECTION, SOLUTION INTRAVASCULAR
Status: COMPLETED | OUTPATIENT
Start: 2024-11-05 | End: 2024-11-05

## 2024-11-05 RX ADMIN — IOPAMIDOL 100 ML: 755 INJECTION, SOLUTION INTRAVENOUS at 13:36

## 2024-12-13 ENCOUNTER — HOSPITAL ENCOUNTER (OUTPATIENT)
Dept: ONCOLOGY | Facility: HOSPITAL | Age: 64
Discharge: HOME | End: 2024-12-13
Payer: COMMERCIAL

## 2024-12-13 ENCOUNTER — LAB (OUTPATIENT)
Dept: LAB | Facility: HOSPITAL | Age: 64
End: 2024-12-13
Payer: COMMERCIAL

## 2024-12-13 ENCOUNTER — OFFICE VISIT (OUTPATIENT)
Dept: ONCOLOGY | Facility: CLINIC | Age: 64
End: 2024-12-13
Payer: COMMERCIAL

## 2024-12-13 VITALS
SYSTOLIC BLOOD PRESSURE: 103 MMHG | HEART RATE: 82 BPM | WEIGHT: 181.4 LBS | HEIGHT: 66 IN | OXYGEN SATURATION: 98 % | DIASTOLIC BLOOD PRESSURE: 69 MMHG | BODY MASS INDEX: 29.15 KG/M2 | TEMPERATURE: 98.3 F

## 2024-12-13 DIAGNOSIS — C20 RECTAL CANCER: Primary | ICD-10-CM

## 2024-12-13 DIAGNOSIS — G62.9 NEUROPATHY: ICD-10-CM

## 2024-12-13 DIAGNOSIS — D64.9 ANEMIA, UNSPECIFIED TYPE: ICD-10-CM

## 2024-12-13 DIAGNOSIS — D50.9 IRON DEFICIENCY ANEMIA, UNSPECIFIED IRON DEFICIENCY ANEMIA TYPE: ICD-10-CM

## 2024-12-13 DIAGNOSIS — C20 RECTAL CANCER: ICD-10-CM

## 2024-12-13 DIAGNOSIS — Z45.2 ENCOUNTER FOR CARE RELATED TO VASCULAR ACCESS PORT: Primary | ICD-10-CM

## 2024-12-13 LAB
ALBUMIN SERPL-MCNC: 4.2 G/DL (ref 3.5–5.2)
ALBUMIN/GLOB SERPL: 1.4 G/DL
ALP SERPL-CCNC: 135 U/L (ref 39–117)
ALT SERPL W P-5'-P-CCNC: 50 U/L (ref 1–33)
ANION GAP SERPL CALCULATED.3IONS-SCNC: 8.5 MMOL/L (ref 5–15)
AST SERPL-CCNC: 30 U/L (ref 1–32)
BASOPHILS # BLD AUTO: 0.04 10*3/MM3 (ref 0–0.2)
BASOPHILS NFR BLD AUTO: 0.5 % (ref 0–1.5)
BILIRUB SERPL-MCNC: 0.5 MG/DL (ref 0–1.2)
BUN SERPL-MCNC: 14 MG/DL (ref 8–23)
BUN/CREAT SERPL: 17.5 (ref 7–25)
CALCIUM SPEC-SCNC: 9.7 MG/DL (ref 8.6–10.5)
CEA SERPL-MCNC: 0.75 NG/ML
CHLORIDE SERPL-SCNC: 103 MMOL/L (ref 98–107)
CO2 SERPL-SCNC: 23.5 MMOL/L (ref 22–29)
CREAT SERPL-MCNC: 0.8 MG/DL (ref 0.57–1)
DEPRECATED RDW RBC AUTO: 46.7 FL (ref 37–54)
EGFRCR SERPLBLD CKD-EPI 2021: 83.4 ML/MIN/1.73
EOSINOPHIL # BLD AUTO: 0.34 10*3/MM3 (ref 0–0.4)
EOSINOPHIL NFR BLD AUTO: 3.9 % (ref 0.3–6.2)
ERYTHROCYTE [DISTWIDTH] IN BLOOD BY AUTOMATED COUNT: 13.5 % (ref 12.3–15.4)
GLOBULIN UR ELPH-MCNC: 2.9 GM/DL
GLUCOSE SERPL-MCNC: 81 MG/DL (ref 65–99)
HCT VFR BLD AUTO: 39.5 % (ref 34–46.6)
HGB BLD-MCNC: 12.8 G/DL (ref 12–15.9)
LYMPHOCYTES # BLD AUTO: 1.04 10*3/MM3 (ref 0.7–3.1)
LYMPHOCYTES NFR BLD AUTO: 12.1 % (ref 19.6–45.3)
MCH RBC QN AUTO: 31.4 PG (ref 26.6–33)
MCHC RBC AUTO-ENTMCNC: 32.4 G/DL (ref 31.5–35.7)
MCV RBC AUTO: 96.8 FL (ref 79–97)
MONOCYTES # BLD AUTO: 0.68 10*3/MM3 (ref 0.1–0.9)
MONOCYTES NFR BLD AUTO: 7.9 % (ref 5–12)
NEUTROPHILS NFR BLD AUTO: 6.52 10*3/MM3 (ref 1.7–7)
NEUTROPHILS NFR BLD AUTO: 75.6 % (ref 42.7–76)
PLATELET # BLD AUTO: 374 10*3/MM3 (ref 140–450)
PMV BLD AUTO: 8.3 FL (ref 6–12)
POTASSIUM SERPL-SCNC: 4.6 MMOL/L (ref 3.5–5.2)
PROT SERPL-MCNC: 7.1 G/DL (ref 6–8.5)
RBC # BLD AUTO: 4.08 10*6/MM3 (ref 3.77–5.28)
SODIUM SERPL-SCNC: 135 MMOL/L (ref 136–145)
WBC NRBC COR # BLD AUTO: 8.62 10*3/MM3 (ref 3.4–10.8)

## 2024-12-13 PROCEDURE — 36415 COLL VENOUS BLD VENIPUNCTURE: CPT

## 2024-12-13 PROCEDURE — G0463 HOSPITAL OUTPT CLINIC VISIT: HCPCS

## 2024-12-13 PROCEDURE — 85025 COMPLETE CBC W/AUTO DIFF WBC: CPT

## 2024-12-13 PROCEDURE — 25010000002 HEPARIN LOCK FLUSH PER 10 UNITS: Performed by: STUDENT IN AN ORGANIZED HEALTH CARE EDUCATION/TRAINING PROGRAM

## 2024-12-13 PROCEDURE — 82378 CARCINOEMBRYONIC ANTIGEN: CPT | Performed by: STUDENT IN AN ORGANIZED HEALTH CARE EDUCATION/TRAINING PROGRAM

## 2024-12-13 PROCEDURE — 80053 COMPREHEN METABOLIC PANEL: CPT | Performed by: STUDENT IN AN ORGANIZED HEALTH CARE EDUCATION/TRAINING PROGRAM

## 2024-12-13 RX ORDER — SODIUM CHLORIDE 0.9 % (FLUSH) 0.9 %
20 SYRINGE (ML) INJECTION AS NEEDED
Status: DISCONTINUED | OUTPATIENT
Start: 2024-12-13 | End: 2024-12-14 | Stop reason: HOSPADM

## 2024-12-13 RX ORDER — HEPARIN SODIUM (PORCINE) LOCK FLUSH IV SOLN 100 UNIT/ML 100 UNIT/ML
500 SOLUTION INTRAVENOUS AS NEEDED
OUTPATIENT
Start: 2024-12-13

## 2024-12-13 RX ORDER — HEPARIN SODIUM (PORCINE) LOCK FLUSH IV SOLN 100 UNIT/ML 100 UNIT/ML
500 SOLUTION INTRAVENOUS AS NEEDED
Status: DISCONTINUED | OUTPATIENT
Start: 2024-12-13 | End: 2024-12-14 | Stop reason: HOSPADM

## 2024-12-13 RX ORDER — SODIUM CHLORIDE 0.9 % (FLUSH) 0.9 %
20 SYRINGE (ML) INJECTION AS NEEDED
OUTPATIENT
Start: 2024-12-13

## 2024-12-13 RX ADMIN — HEPARIN 500 UNITS: 100 SYRINGE at 12:08

## 2024-12-13 RX ADMIN — Medication 20 ML: at 12:07

## 2024-12-13 NOTE — PROGRESS NOTES
1207 Port accessed and flushed with good blood return noted. No labs collected. Port flushed with saline and heparin prior to needle removal.

## 2025-01-09 LAB
NTRA SIGNATERA MTM READOUT: 0 MTM/ML
NTRA SIGNATERA TEST RESULT: NEGATIVE

## 2025-01-24 ENCOUNTER — HOSPITAL ENCOUNTER (OUTPATIENT)
Dept: ONCOLOGY | Facility: HOSPITAL | Age: 65
Discharge: HOME | End: 2025-01-24
Payer: COMMERCIAL

## 2025-01-24 DIAGNOSIS — Z45.2 ENCOUNTER FOR CARE RELATED TO VASCULAR ACCESS PORT: Primary | ICD-10-CM

## 2025-01-24 PROCEDURE — 25010000002 HEPARIN LOCK FLUSH PER 10 UNITS: Performed by: STUDENT IN AN ORGANIZED HEALTH CARE EDUCATION/TRAINING PROGRAM

## 2025-01-24 PROCEDURE — 96523 IRRIG DRUG DELIVERY DEVICE: CPT

## 2025-01-24 RX ORDER — SODIUM CHLORIDE 0.9 % (FLUSH) 0.9 %
20 SYRINGE (ML) INJECTION AS NEEDED
Status: DISCONTINUED | OUTPATIENT
Start: 2025-01-24 | End: 2025-01-25 | Stop reason: HOSPADM

## 2025-01-24 RX ORDER — HEPARIN SODIUM (PORCINE) LOCK FLUSH IV SOLN 100 UNIT/ML 100 UNIT/ML
500 SOLUTION INTRAVENOUS AS NEEDED
Status: DISCONTINUED | OUTPATIENT
Start: 2025-01-24 | End: 2025-01-25 | Stop reason: HOSPADM

## 2025-01-24 RX ADMIN — Medication 20 ML: at 09:59

## 2025-01-24 RX ADMIN — Medication 300 UNITS: at 09:59

## 2025-01-24 NOTE — PROGRESS NOTES
Port accessed and flushed with good blood return noted. No labs collected. Port flushed with saline and heparin prior to needle removal.

## 2025-02-03 LAB
NTRA SIGNATERA MTM READOUT: 0 MTM/ML
NTRA SIGNATERA TEST RESULT: NEGATIVE

## 2025-03-06 ENCOUNTER — HOSPITAL ENCOUNTER (OUTPATIENT)
Dept: ONCOLOGY | Facility: HOSPITAL | Age: 65
Discharge: HOME | End: 2025-03-06
Admitting: STUDENT IN AN ORGANIZED HEALTH CARE EDUCATION/TRAINING PROGRAM
Payer: COMMERCIAL

## 2025-03-06 DIAGNOSIS — C20 RECTAL CANCER: ICD-10-CM

## 2025-03-06 DIAGNOSIS — Z45.2 ENCOUNTER FOR CARE RELATED TO VASCULAR ACCESS PORT: Primary | ICD-10-CM

## 2025-03-06 LAB
ALBUMIN SERPL-MCNC: 4.1 G/DL (ref 3.5–5.2)
ALBUMIN/GLOB SERPL: 1.7 G/DL
ALP SERPL-CCNC: 88 U/L (ref 39–117)
ALT SERPL W P-5'-P-CCNC: 15 U/L (ref 1–33)
ANION GAP SERPL CALCULATED.3IONS-SCNC: 11.7 MMOL/L (ref 5–15)
AST SERPL-CCNC: 19 U/L (ref 1–32)
BASOPHILS # BLD AUTO: 0.05 10*3/MM3 (ref 0–0.2)
BASOPHILS NFR BLD AUTO: 1.1 % (ref 0–1.5)
BILIRUB SERPL-MCNC: 0.9 MG/DL (ref 0–1.2)
BUN SERPL-MCNC: 13 MG/DL (ref 8–23)
BUN/CREAT SERPL: 16.5 (ref 7–25)
CALCIUM SPEC-SCNC: 9.6 MG/DL (ref 8.6–10.5)
CEA SERPL-MCNC: 1.23 NG/ML
CHLORIDE SERPL-SCNC: 107 MMOL/L (ref 98–107)
CO2 SERPL-SCNC: 24.3 MMOL/L (ref 22–29)
CREAT SERPL-MCNC: 0.79 MG/DL (ref 0.57–1)
DEPRECATED RDW RBC AUTO: 52.1 FL (ref 37–54)
EGFRCR SERPLBLD CKD-EPI 2021: 84.2 ML/MIN/1.73
EOSINOPHIL # BLD AUTO: 0.26 10*3/MM3 (ref 0–0.4)
EOSINOPHIL NFR BLD AUTO: 5.6 % (ref 0.3–6.2)
ERYTHROCYTE [DISTWIDTH] IN BLOOD BY AUTOMATED COUNT: 14.8 % (ref 12.3–15.4)
GLOBULIN UR ELPH-MCNC: 2.4 GM/DL
GLUCOSE SERPL-MCNC: 82 MG/DL (ref 65–99)
HCT VFR BLD AUTO: 38.1 % (ref 34–46.6)
HGB BLD-MCNC: 12 G/DL (ref 12–15.9)
LYMPHOCYTES # BLD AUTO: 0.84 10*3/MM3 (ref 0.7–3.1)
LYMPHOCYTES NFR BLD AUTO: 18 % (ref 19.6–45.3)
MCH RBC QN AUTO: 31 PG (ref 26.6–33)
MCHC RBC AUTO-ENTMCNC: 31.5 G/DL (ref 31.5–35.7)
MCV RBC AUTO: 98.4 FL (ref 79–97)
MONOCYTES # BLD AUTO: 0.54 10*3/MM3 (ref 0.1–0.9)
MONOCYTES NFR BLD AUTO: 11.6 % (ref 5–12)
NEUTROPHILS NFR BLD AUTO: 2.98 10*3/MM3 (ref 1.7–7)
NEUTROPHILS NFR BLD AUTO: 63.7 % (ref 42.7–76)
PLATELET # BLD AUTO: 266 10*3/MM3 (ref 140–450)
PMV BLD AUTO: 9.2 FL (ref 6–12)
POTASSIUM SERPL-SCNC: 4.2 MMOL/L (ref 3.5–5.2)
PROT SERPL-MCNC: 6.5 G/DL (ref 6–8.5)
RBC # BLD AUTO: 3.87 10*6/MM3 (ref 3.77–5.28)
SODIUM SERPL-SCNC: 143 MMOL/L (ref 136–145)
WBC NRBC COR # BLD AUTO: 4.67 10*3/MM3 (ref 3.4–10.8)

## 2025-03-06 PROCEDURE — 25010000002 HEPARIN LOCK FLUSH PER 10 UNITS: Performed by: STUDENT IN AN ORGANIZED HEALTH CARE EDUCATION/TRAINING PROGRAM

## 2025-03-06 PROCEDURE — 36591 DRAW BLOOD OFF VENOUS DEVICE: CPT

## 2025-03-06 PROCEDURE — 82378 CARCINOEMBRYONIC ANTIGEN: CPT | Performed by: STUDENT IN AN ORGANIZED HEALTH CARE EDUCATION/TRAINING PROGRAM

## 2025-03-06 PROCEDURE — 80053 COMPREHEN METABOLIC PANEL: CPT | Performed by: STUDENT IN AN ORGANIZED HEALTH CARE EDUCATION/TRAINING PROGRAM

## 2025-03-06 PROCEDURE — 85025 COMPLETE CBC W/AUTO DIFF WBC: CPT | Performed by: STUDENT IN AN ORGANIZED HEALTH CARE EDUCATION/TRAINING PROGRAM

## 2025-03-06 RX ORDER — SODIUM CHLORIDE 0.9 % (FLUSH) 0.9 %
20 SYRINGE (ML) INJECTION AS NEEDED
Status: DISCONTINUED | OUTPATIENT
Start: 2025-03-06 | End: 2025-03-07 | Stop reason: HOSPADM

## 2025-03-06 RX ORDER — HEPARIN SODIUM (PORCINE) LOCK FLUSH IV SOLN 100 UNIT/ML 100 UNIT/ML
500 SOLUTION INTRAVENOUS AS NEEDED
Status: DISCONTINUED | OUTPATIENT
Start: 2025-03-06 | End: 2025-03-07 | Stop reason: HOSPADM

## 2025-03-06 RX ADMIN — HEPARIN 500 UNITS: 100 SYRINGE at 08:43

## 2025-03-06 RX ADMIN — Medication 20 ML: at 08:43

## 2025-03-13 LAB
NTRA SIGNATERA MTM READOUT: 0 MTM/ML
NTRA SIGNATERA TEST RESULT: NEGATIVE

## 2025-05-09 NOTE — PROGRESS NOTES
HEMATOLOGY ONCOLOGY OUTPATIENT FOLLOW UP       Patient name: Xochitl Hanson  : 1960  MRN: [id number]  Primary Care Physician: Eliza Lemus APRN  Referring Physician: Eliza Lemus APRN  Reason For Consult:       History of Present Illness:  Patient is a 65 y.o. female with known diagnosis of stage III adenocarcinoma of the rectum who is presented today to establish her care.  She was previously following with Dr. Tamayo.  Her oncologic history is summarized as follows:    Patient had rectal bleeding for several months but amount had increased significantly During 2023. No symptoms of weight loss, rectal pain, or difficulty moving bowels. Previous colonoscopy completed 10 years ago ().     She underwent colonoscopy on 6/15/2023 with Dr. Horan at [facility]. Findings include a single sessile 3 mm polyp of benign appearance found in the cecum (polypectomy), and ulcerated mass with stigmata of recent bleeding of malignant appearance found in the rectum at the distance between 12 cm and 16 cm from the anus causing partial obstruction. Multiple cold forceps biopsies were performed. Pathology from the polypectomy revealed mucosal polyp with predominant benign lymphoid aggregate, negative for adenoma. Pathology from the rectal mass revealed invasive moderately differentiated adenocarcinoma with focal mucinous component. No loss of mucoid expression of MMR proteins and no evidence of deficient mismatch pair.  She was referred to Dr. Tamayo.     CT CAP on 2023 at [facility] showed no evidence of metastatic disease in the chest. Large hiatal hernia. Rectal mass with suggestion of local invasion of adjacent fat with several small but suspicious lymph nodes adjacent to the lesion. No evidence of distant metastatic disease to the liver or retroperitoneum.      She was seen in consult by Dr. Quintin Tamayo ([facility]) on 2023.   Planned to obtain baseline labs including CBC, CMP, and CEA level   MRI of the rectum ordered to complete staging. She was referred to Dr. Fairchild for consideration of surgery.     She was seen in consult by Dr. Vannesa Fairchild ([facility]) on 6/27/2023. Rigid proctoscopy completed in the office. Tumor appeared to be in the upper rectum both on rigid proctoscopy and colonoscopy. If so, patient may be able to avoid XRT. Awaiting MRI and discussion at Multi-D Tumor Board. Discussed that if early-stage or upper rectal, may go straight to surgery versus neoadjuvant chemo.     MRI at [place]-- Not available presently.     [facility] Multi-D Tumor Board on 7/5/2023 recommended neoadjuvant chemoradiation due to early T3a extension to muscularis propria and 5 mm lymph nodes noted on MRI. No EVMI.    7/27/23: Patient started Neoadjuvant Chemotherapy with FOLFOXIRI.  Cycle 1 was complicated by an infusion reaction to irinotecan which was managed with dexamethasone, Pepcid and Benadryl.  PEG filgrastim was added with cycle 2 FOLFOXIRI due to cytopenias.    7/27/2023: C1 D1 FOLFOXIRI  8/15/2023: C2 D1 FOLFOXIRI  10/9/2023: C6 D1 FOLFOXIRI  10/20/2023: Chemotherapy dose reduced by 20% due to thrombocytopenia.    10/27/2023: CT chest abdomen pelvis with contrast done at [facility] revealed patient's known rectal malignancy not well-visualized.  There was decreased surrounding perirectal fat stranding and no evidence of abdominopelvic metastatic disease.  CT chest was negative for any evidence of metastatic disease.    11/13/2023: C8-D1 FOLFOXIRI     11/30/2023: CT simulation for neoadjuvant radiation with concurrent 5-FU.    12/11/2023: Patient started on neoadjuvant radiation with weekly 5-FU infusion.     12/27/23: Patient presents for initial consultation today, She is undergoing neoadjuvant ChemoRT and is tolerating therapy relatively well. Denied any significant treatment related adverse effects presently,  except for fatigue. No weight/appetite changes, no Rectal bleeding. Cancer history as above.    1/8/23: patient resumed her concurrent ChemoRT at [facility].    3/20/24: CT Chest/Abdomen/Pelvis:  IMPRESSION:    1.There is a nonspecific 5 mm part solid nodule within the left upper lobe. No priors are available for direct comparison. Recommend correlation with prior outside imaging if available  2.Otherwise, no evidence of metastatic disease within the chest.   3. No evidence of metastatic disease within the abdomen.     3/21/24: MRI pelvis:  IMPRESSION:  1.Posttreatment changes in the upper rectum without definitive evidence for residual tumor at the treatment site.  2.Small perirectal fluid collection measuring 1.6 cm extends from the 12:00 position of the upper rectum into the anterior peritoneal reflection. This may represent a small abscess or a sinus tract. This collection/tract does not appear to communicate with the uterus or vagina.  3.No evidence for pelvic metastatic disease.     3/22/24: Sigmoidoscopy and Rectal Biopsy:  FINAL DIAGNOSIS:   A. Rectal scar, biopsy:   - Reactive epithelium with pools of mucin.   - Focal stromal reactive atypia, negative CK AE1/3 immunostain is supportive.   - Negative for dysplasia or malignancy.   - Multiple levels examined.   COMMENT:   The superficial nature of the biopsy precludes evaluation of deeper layers of the rectal wall.   Multiple levels examined. Clinical and endoscopic correlation is recommended.     5/7/2024:Patient seen today for follow up, She has completed neoadjuvant chemotherapy and Chemoradiation as above. She was thereafter evalauted by Dr. Fairchild and underwent rectal biopsy which did not show any evidence of residual disease. Follow up imaging including MRI also consistent with Complete response. She was not considered a candidate for LAR/curative surgery given complete response.     Patient is doing well today. She denied any significant  complaints today including any GI issues. She is interested in having her port removed. Has mild neuropathy which has improved somewhat following completion of treatment. ROS otherwise negative.     7/23/2024: MRI pelvis with without contrast:  IMPRESSION:     Since 06/26/2023, the posttreatment primary tumor and extramural disease shows: near complete response.   Post-treatment category: ymrT1/2, ymrN0   Circumferential resection margin: Not applicable   Sphincter involvement: No   Suspicious extra mesorectal lymph nodes: No         10/25/24:  Rectal scar, biopsy:   - At least intra-mucosal carcinoma, suspicious to represent recurrent invasive adenocarcinoma   COMMENT:   It is not possible to make a definitive diagnosis of invasive adenocarcinoma because the sample   is markedly fragmented, the dysplastic glands observed are associated with necroinflammatory   debris and granulation tissue.  In addition, there is no definitive desmoplasia seen. In the   context of prior rectal cancer, the findings are suspicious to represent recurrent cancer.     10/30/24: Patient seen today for follow-up.  She has recently undergone sigmoidoscopy and pelvic MRI at [facility] as above.  Clinically doing well overall.  Denied any acute complaints presently.  Peripheral neuropathy stable.     11/5/24: CT Chest/Abdomen/Pelvis W contrast:  Impression:   1.No definite findings of new metastatic disease in the chest, abdomen, or pelvis at this time.   2.Limited evaluation of the rectum which is mostly nondistended on this exam. No definite CT evidence of recurrent malignancy.   3.5 mm subsolid nodule in the left upper lobe appears stable.   4.Hiatal hernia containing the upper stomach.   5.Mild fatty infiltration of the liver.       11.25.24: Rectum. low anterior resection:   - Invasive mucinous adenocarcinoma, moderately differentiated,     pT2 N0.   - Absent response to treatment (score 3).   - Negative surgical margins.   - Seventeen  lymph nodes, negative for carcinoma (0/17).     12/13/24: Patient seen today for follow-up after recent bowel surgery/colectomy for local recurrence of colon cancer.  She is status post diverting ileostomy.  Clinically doing well.  Denied any significant issues with ileostomy.  She is tentatively planned for ileostomy reversal in late January - February 2025.      Subjective:  5/14/25: S/p ileostomy reversal in Feb 2025. Overall doing well. No acute complaints. Did not have CT scans since last visit. She appears  Somewhat confused about her appointments, and insists on having the port taken out.  Takes MVI and calcium supplements,    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    Rectal cancer 06/27/2023       Past Surgical History:   Procedure Laterality Date    ENDOMETRIAL ABLATION      TUBAL ABDOMINAL LIGATION           Current Outpatient Medications:     ALPRAZolam (XANAX) 0.5 MG tablet, Take 1 tablet by mouth 2 (Two) Times a Day As Needed., Disp: , Rfl:     Biotin 1 MG capsule, 1 Tab, Oral, Daily, 0 Refill(s), Disp: , Rfl:     Calcium Carb-Cholecalciferol (Calcium 500 + D) 500-5 MG-MCG tablet per tablet, 1 Tab, Chew, BID, 0 Refill(s), Disp: , Rfl:     cyclobenzaprine (FLEXERIL) 10 MG tablet, , Disp: , Rfl:     DULoxetine HCl 40 MG capsule delayed-release particles, 1 capsule Daily., Disp: , Rfl:     esomeprazole (nexIUM) 40 MG capsule, Take 1  "capsule by mouth Every Morning Before Breakfast., Disp: , Rfl:     HYDROcodone-acetaminophen (NORCO)  MG per tablet, TAKE 1 TABLET BY MOUTH THREE TIMES A DAY AS NEEDED FOR 7 DAYS, Disp: , Rfl:     ibuprofen (ADVIL,MOTRIN) 200 MG tablet, Take 1 tablet by mouth Every 6 (Six) Hours As Needed. (Patient not taking: Reported on 7/30/2024), Disp: , Rfl:     lisinopril (PRINIVIL,ZESTRIL) 20 MG tablet, Take 1 tablet by mouth Daily., Disp: , Rfl:     meloxicam (MOBIC) 15 MG tablet, TAKE 1 TABLET EVERY DAY BY ORAL ROUTE AS NEEDED, FOR BACK PAIN. (Patient not taking: Reported on 12/13/2024), Disp: , Rfl:     multivitamin (THERAGRAN) tablet tablet, Take 1 tablet by mouth Daily., Disp: , Rfl:     No Known Allergies    Family History   Problem Relation Age of Onset    Prostate cancer Brother        Cancer-related family history includes Prostate cancer in her brother.      Social History     Tobacco Use    Smoking status: Never    Smokeless tobacco: Never   Vaping Use    Vaping status: Never Used   Substance Use Topics    Alcohol use: Never    Drug use: Never     Social History     Social History Narrative    Not on file       ROS:   Review of Systems   Constitutional: Negative.    HENT: Negative.     Eyes: Negative.    Respiratory: Negative.     Cardiovascular: Negative.    Gastrointestinal: Negative.    Endocrine: Negative.    Genitourinary: Negative.    Musculoskeletal: Negative.    Skin: Negative.    Allergic/Immunologic: Negative.    Neurological:  Positive for numbness (bilateral hand and feet numbness).   Hematological: Negative.    Psychiatric/Behavioral: Negative.           Objective:    Vital Signs:  Vitals:    05/14/25 1018   BP: 128/92   Pulse: 71   SpO2: 96%   Weight: 81.7 kg (180 lb 3.2 oz)   Height: 167.6 cm (65.98\")   PainSc: 0-No pain     Body mass index is 29.1 kg/m².    ECOG  (0) Fully active, able to carry on all predisease performance without restriction    Physical Exam:   Physical Exam  Constitutional: " "      Appearance: Normal appearance. She is normal weight.   HENT:      Head: Normocephalic and atraumatic.      Right Ear: External ear normal.      Left Ear: External ear normal.      Nose: Nose normal.      Mouth/Throat:      Mouth: Mucous membranes are moist.      Pharynx: Oropharynx is clear.   Eyes:      Extraocular Movements: Extraocular movements intact.      Conjunctiva/sclera: Conjunctivae normal.      Pupils: Pupils are equal, round, and reactive to light.   Cardiovascular:      Rate and Rhythm: Normal rate and regular rhythm.      Pulses: Normal pulses.      Heart sounds: Normal heart sounds.   Pulmonary:      Effort: Pulmonary effort is normal.      Breath sounds: Normal breath sounds.   Abdominal:      General: Abdomen is flat. Bowel sounds are normal.      Palpations: Abdomen is soft.   Musculoskeletal:         General: Normal range of motion.      Cervical back: Normal range of motion and neck supple.   Skin:     General: Skin is warm.   Neurological:      Mental Status: She is alert.   Psychiatric:         Mood and Affect: Mood normal.         Behavior: Behavior normal.         Thought Content: Thought content normal.         Judgment: Judgment normal.         Lab Results - Last 18 Months   Lab Units 03/06/25  0835 12/13/24  1048 10/30/24  1021   WBC 10*3/mm3 4.67 8.62 4.93   HEMOGLOBIN g/dL 12.0 12.8 14.2   HEMATOCRIT % 38.1 39.5 43.1   PLATELETS 10*3/mm3 266 374 284   MCV fL 98.4* 96.8 96.4       No results for input(s): \"APTT\", \"INR\", \"PTT\" in the last 11766 hours.      Lab Results   Component Value Date    PTT 23.5 (L) 07/19/2023    INR <0.93 (L) 07/19/2023 11/25/24: Synoptic Report:  Procedure: Â Low anterior resection   Macroscopic Evaluation of Mesorectum (required for rectal cancers):     Complete (radial margin is   on black ink, blue ink is only serosa, see gross pictures)   Tumor Site:  Rectum     Rectal Tumor Location:   Straddles anterior peritoneal reflection   Histologic Type:  " Mucinous adenocarcinoma   Histologic Grade:  G2, moderately differentiated   Tumor Size: Â 14.3 cm   Multiple Primary Sites:   Not applicable (no additional primary site(s) present)   Tumor Extent:  Invades into muscularis propria   Sub-mucosal Invasion (required only for pT1 tumors):    Not applicable (not a pT1 tumor)   Macroscopic Tumor Perforation:   Not identified   Lymphatic and / or Vascular Invasion:   Not identified   Perineural Invasion:   Not identified   Tumor Budding Score:  Low (0-4)   Treatment Effect:  Absent, with extensive residual cancer and no evident tumor regression (poor   or no response, score 3)   Margin Status for Invasive Carcinoma:   All margins negative for invasive carcinoma     Closest Margin(s) to Invasive Carcinoma:     Radial (circumferential)     Distance from Invasive Carcinoma to Closest Margin:    12 mm (slide A9, black ink)    Distance from Invasive Carcinoma to Distal Margin:    3.1 cm     Regional Lymph Node Status:   All regional lymph nodes negative for tumor     Number of Lymph Nodes with Tumor:   0     Number of Lymph Nodes Examined  Â : 17     Tumor Deposits: Â Not identified     Distant Site(s) Involved:   Not applicable     pTNM CLASSIFICATION (AJCC 8th Edition):  Â   yr pT2 N0     CASE SUMMARY: -Patient presented in January 2024 for establishment of care, outside medical records, imaging findings, disease prognosis and treatment options were reviewed with the patient.  -Patient has completed Neoadjuvant Chemotherapy with FOLFOXIRI X 8 cycles with minimal adverse effects and no residual toxicities, her performance status is near baseline.  -Recent imaging on 10/27/23 as per outside records showed good treatment response.  -Patient has completed Neoadjuvant Chemo-XRT with Concurrent 5FU infusion [weekly 5FU at 275mg/m2 dose over 96 hour infusion (M-F)]. She tolerated treatment well.  -Noted to have Radiographic and pathologic CR following treatment, patient was  evaluated by Dr. Fairchild and was not thought to be candidate for definitive resection/LAR given CR and was instead recommended for surveillance.    Assessment & Plan :      Stage III Rectal Adenocarcinoma: MSI-H  Local Recurrence in 10//2024, s/p LAR: ypT2N0  -Patient is status post total neoadjuvant therapy with FOLFOXIRI for 8 cycles followed by concurrent chemoradiation.  Initially planned for enhanced surveillance alone/nonsurgical management noted to have local disease recurrence on recent rectal biopsy in October 2024.  -Status post low anterior resection 2524.  Surgical path is consistent with early-stage disease [ypT2N0]   -Discussed these findings in detail with patient, given early stage and localized disease recurrence without any evidence of lavelle involvement or distant metastatic disease, little benefit from additional chemotherapy at this point in my view.  -Will continue with Surveillance with labs and imaging every 3-4 months for now.  -3-month follow-up with restaging scans and labs.  -CEA levels continue to be WNL.  Signatera CT DNA negative in march 2025. Recheck today  -overdue for restaging scans, prior appts got canceled for unknown reason. Will reschedule Scans asap   -if reported SONIDO, continue labs every 3 months and scans every 6 months (patient is worried about her out of pocket cost)    Peripheral neuropathy: Grade 1, has improved following completion of treatment. Will continue to monitor. Patient is on MVI supplement which is acceptable. B12 low normal.  Symptoms have improved.    Macrocytic anemia: Likely Sec to Chemotherapy.  B12 low normal, Folate WNL.  Iron panel showed features of ACD with likely YRN component. Will monitor and consider Iron infusions as needed. Hb/Hct near normal.    Port: Discussed with pt regarding pros and cons of port removal. She wants to have it removed now.. Will refer her to IR for this.    Ileostomy: s/p Reversal at [facility] by Dr. Fairchild. Denied any GI  issues presently.    Insurance issues: She is worried that she would not be able to afford health insurance next year and may have to miss out on her appts and cancer surveillance. Will refer her to financial counsellor to discuss further      2 week telehealth follow up to discuss scans, Follow up in  3 months with repeat labs . Sooner as needed.      Thank you very much for providing the opportunity to participate in this patient’s care. Please do not hesitate to call if there are any other questions.

## 2025-05-14 ENCOUNTER — OFFICE VISIT (OUTPATIENT)
Dept: ONCOLOGY | Facility: CLINIC | Age: 65
End: 2025-05-14
Payer: COMMERCIAL

## 2025-05-14 ENCOUNTER — LAB (OUTPATIENT)
Dept: LAB | Facility: HOSPITAL | Age: 65
End: 2025-05-14
Payer: COMMERCIAL

## 2025-05-14 ENCOUNTER — APPOINTMENT (OUTPATIENT)
Dept: LAB | Facility: HOSPITAL | Age: 65
End: 2025-05-14
Payer: COMMERCIAL

## 2025-05-14 VITALS
DIASTOLIC BLOOD PRESSURE: 92 MMHG | OXYGEN SATURATION: 96 % | HEIGHT: 66 IN | SYSTOLIC BLOOD PRESSURE: 128 MMHG | HEART RATE: 71 BPM | BODY MASS INDEX: 28.96 KG/M2 | WEIGHT: 180.2 LBS

## 2025-05-14 DIAGNOSIS — D64.9 ANEMIA, UNSPECIFIED TYPE: ICD-10-CM

## 2025-05-14 DIAGNOSIS — C20 RECTAL CANCER: ICD-10-CM

## 2025-05-14 DIAGNOSIS — Z45.2 ENCOUNTER FOR CARE RELATED TO VASCULAR ACCESS PORT: ICD-10-CM

## 2025-05-14 DIAGNOSIS — C20 RECTAL CANCER: Primary | ICD-10-CM

## 2025-05-14 LAB
ALBUMIN SERPL-MCNC: 4.2 G/DL (ref 3.5–5.2)
ALBUMIN/GLOB SERPL: 1.6 G/DL
ALP SERPL-CCNC: 97 U/L (ref 39–117)
ALT SERPL W P-5'-P-CCNC: 20 U/L (ref 1–33)
ANION GAP SERPL CALCULATED.3IONS-SCNC: 9.6 MMOL/L (ref 5–15)
AST SERPL-CCNC: 17 U/L (ref 1–32)
BASOPHILS # BLD AUTO: 0.02 10*3/MM3 (ref 0–0.2)
BASOPHILS NFR BLD AUTO: 0.4 % (ref 0–1.5)
BILIRUB SERPL-MCNC: 0.5 MG/DL (ref 0–1.2)
BUN SERPL-MCNC: 13 MG/DL (ref 8–23)
BUN/CREAT SERPL: 16.5 (ref 7–25)
CALCIUM SPEC-SCNC: 9.6 MG/DL (ref 8.6–10.5)
CEA SERPL-MCNC: 1.13 NG/ML
CHLORIDE SERPL-SCNC: 105 MMOL/L (ref 98–107)
CO2 SERPL-SCNC: 26.4 MMOL/L (ref 22–29)
CREAT SERPL-MCNC: 0.79 MG/DL (ref 0.57–1)
DEPRECATED RDW RBC AUTO: 45.2 FL (ref 37–54)
EGFRCR SERPLBLD CKD-EPI 2021: 84.2 ML/MIN/1.73
EOSINOPHIL # BLD AUTO: 0.19 10*3/MM3 (ref 0–0.4)
EOSINOPHIL NFR BLD AUTO: 3.8 % (ref 0.3–6.2)
ERYTHROCYTE [DISTWIDTH] IN BLOOD BY AUTOMATED COUNT: 13.4 % (ref 12.3–15.4)
FERRITIN SERPL-MCNC: 27.2 NG/ML (ref 13–150)
FOLATE SERPL-MCNC: >20 NG/ML (ref 4.78–24.2)
GLOBULIN UR ELPH-MCNC: 2.6 GM/DL
GLUCOSE SERPL-MCNC: 90 MG/DL (ref 65–99)
HCT VFR BLD AUTO: 41 % (ref 34–46.6)
HGB BLD-MCNC: 13.3 G/DL (ref 12–15.9)
IRON 24H UR-MRATE: 93 MCG/DL (ref 37–145)
IRON SATN MFR SERPL: 20 % (ref 20–50)
LYMPHOCYTES # BLD AUTO: 1.15 10*3/MM3 (ref 0.7–3.1)
LYMPHOCYTES NFR BLD AUTO: 23 % (ref 19.6–45.3)
MCH RBC QN AUTO: 31.1 PG (ref 26.6–33)
MCHC RBC AUTO-ENTMCNC: 32.4 G/DL (ref 31.5–35.7)
MCV RBC AUTO: 95.8 FL (ref 79–97)
MONOCYTES # BLD AUTO: 0.43 10*3/MM3 (ref 0.1–0.9)
MONOCYTES NFR BLD AUTO: 8.6 % (ref 5–12)
NEUTROPHILS NFR BLD AUTO: 3.21 10*3/MM3 (ref 1.7–7)
NEUTROPHILS NFR BLD AUTO: 64.2 % (ref 42.7–76)
PLATELET # BLD AUTO: 315 10*3/MM3 (ref 140–450)
PMV BLD AUTO: 9.1 FL (ref 6–12)
POTASSIUM SERPL-SCNC: 4.2 MMOL/L (ref 3.5–5.2)
PROT SERPL-MCNC: 6.8 G/DL (ref 6–8.5)
RBC # BLD AUTO: 4.28 10*6/MM3 (ref 3.77–5.28)
SODIUM SERPL-SCNC: 141 MMOL/L (ref 136–145)
TIBC SERPL-MCNC: 468 MCG/DL (ref 298–536)
TRANSFERRIN SERPL-MCNC: 314 MG/DL (ref 200–360)
VIT B12 BLD-MCNC: 243 PG/ML (ref 211–946)
WBC NRBC COR # BLD AUTO: 5 10*3/MM3 (ref 3.4–10.8)

## 2025-05-14 PROCEDURE — 85025 COMPLETE CBC W/AUTO DIFF WBC: CPT

## 2025-05-14 PROCEDURE — 99214 OFFICE O/P EST MOD 30 MIN: CPT | Performed by: STUDENT IN AN ORGANIZED HEALTH CARE EDUCATION/TRAINING PROGRAM

## 2025-05-14 PROCEDURE — 83540 ASSAY OF IRON: CPT | Performed by: STUDENT IN AN ORGANIZED HEALTH CARE EDUCATION/TRAINING PROGRAM

## 2025-05-14 PROCEDURE — 36415 COLL VENOUS BLD VENIPUNCTURE: CPT

## 2025-05-14 PROCEDURE — 84466 ASSAY OF TRANSFERRIN: CPT | Performed by: STUDENT IN AN ORGANIZED HEALTH CARE EDUCATION/TRAINING PROGRAM

## 2025-05-14 PROCEDURE — 82746 ASSAY OF FOLIC ACID SERUM: CPT | Performed by: STUDENT IN AN ORGANIZED HEALTH CARE EDUCATION/TRAINING PROGRAM

## 2025-05-14 PROCEDURE — 82607 VITAMIN B-12: CPT | Performed by: STUDENT IN AN ORGANIZED HEALTH CARE EDUCATION/TRAINING PROGRAM

## 2025-05-14 PROCEDURE — 80053 COMPREHEN METABOLIC PANEL: CPT | Performed by: STUDENT IN AN ORGANIZED HEALTH CARE EDUCATION/TRAINING PROGRAM

## 2025-05-14 PROCEDURE — 82378 CARCINOEMBRYONIC ANTIGEN: CPT | Performed by: STUDENT IN AN ORGANIZED HEALTH CARE EDUCATION/TRAINING PROGRAM

## 2025-05-14 PROCEDURE — 82728 ASSAY OF FERRITIN: CPT | Performed by: STUDENT IN AN ORGANIZED HEALTH CARE EDUCATION/TRAINING PROGRAM

## 2025-05-14 RX ORDER — GABAPENTIN 300 MG/1
CAPSULE ORAL
COMMUNITY
Start: 2025-02-04 | End: 2025-05-14

## 2025-05-15 ENCOUNTER — HOSPITAL ENCOUNTER (OUTPATIENT)
Dept: PET IMAGING | Facility: HOSPITAL | Age: 65
Discharge: HOME | End: 2025-05-15
Admitting: STUDENT IN AN ORGANIZED HEALTH CARE EDUCATION/TRAINING PROGRAM
Payer: COMMERCIAL

## 2025-05-15 DIAGNOSIS — C20 RECTAL CANCER: ICD-10-CM

## 2025-05-15 PROCEDURE — 74177 CT ABD & PELVIS W/CONTRAST: CPT

## 2025-05-15 PROCEDURE — 71260 CT THORAX DX C+: CPT

## 2025-05-15 PROCEDURE — 25510000001 IOPAMIDOL PER 1 ML: Performed by: STUDENT IN AN ORGANIZED HEALTH CARE EDUCATION/TRAINING PROGRAM

## 2025-05-15 RX ORDER — IOPAMIDOL 755 MG/ML
100 INJECTION, SOLUTION INTRAVASCULAR
Status: COMPLETED | OUTPATIENT
Start: 2025-05-15 | End: 2025-05-15

## 2025-05-15 RX ADMIN — IOPAMIDOL 100 ML: 755 INJECTION, SOLUTION INTRAVENOUS at 08:36

## 2025-05-22 LAB
NTRA SIGNATERA MTM READOUT: 0 MTM/ML
NTRA SIGNATERA TEST RESULT: NEGATIVE

## 2025-05-23 NOTE — PROGRESS NOTES
HEMATOLOGY ONCOLOGY OUTPATIENT FOLLOW UP       Patient name: Xochitl Hanson  : 1960  MRN: [id number]  Primary Care Physician: Eliza Lemus APRN  Referring Physician: Eliza Lemus APRN  Reason For Consult:       History of Present Illness:  Patient is a 65 y.o. female with known diagnosis of stage III adenocarcinoma of the rectum who is presented today to establish her care.  She was previously following with Dr. Tamayo.  Her oncologic history is summarized as follows:    Patient had rectal bleeding for several months but amount had increased significantly During 2023. No symptoms of weight loss, rectal pain, or difficulty moving bowels. Previous colonoscopy completed 10 years ago ().     She underwent colonoscopy on 6/15/2023 with Dr. Horan at [facility]. Findings include a single sessile 3 mm polyp of benign appearance found in the cecum (polypectomy), and ulcerated mass with stigmata of recent bleeding of malignant appearance found in the rectum at the distance between 12 cm and 16 cm from the anus causing partial obstruction. Multiple cold forceps biopsies were performed. Pathology from the polypectomy revealed mucosal polyp with predominant benign lymphoid aggregate, negative for adenoma. Pathology from the rectal mass revealed invasive moderately differentiated adenocarcinoma with focal mucinous component. No loss of mucoid expression of MMR proteins and no evidence of deficient mismatch pair.  She was referred to Dr. Tamayo.     CT CAP on 2023 at [facility] showed no evidence of metastatic disease in the chest. Large hiatal hernia. Rectal mass with suggestion of local invasion of adjacent fat with several small but suspicious lymph nodes adjacent to the lesion. No evidence of distant metastatic disease to the liver or retroperitoneum.      She was seen in consult by Dr. Quintin Tamayo ([facility]) on 2023.   Planned to obtain baseline labs including CBC, CMP, and CEA level   MRI of the rectum ordered to complete staging. She was referred to Dr. Fairchild for consideration of surgery.     She was seen in consult by Dr. Vannesa Fairchild ([facility]) on 6/27/2023. Rigid proctoscopy completed in the office. Tumor appeared to be in the upper rectum both on rigid proctoscopy and colonoscopy. If so, patient may be able to avoid XRT. Awaiting MRI and discussion at [facility]. Discussed that if early-stage or upper rectal, may go straight to surgery versus neoadjuvant chemo.     MRI at [place]-- Not available presently.     [facility] on 7/5/2023 recommended neoadjuvant chemoradiation due to early T3a extension to muscularis propria and 5 mm lymph nodes noted on MRI. No EVMI.    7/27/23: Patient started Neoadjuvant Chemotherapy with FOLFOXIRI.  Cycle 1 was complicated by an infusion reaction to irinotecan which was managed with dexamethasone, Pepcid and Benadryl.  PEG filgrastim was added with cycle 2 FOLFOXIRI due to cytopenias.    7/27/2023: C1 D1 FOLFOXIRI  8/15/2023: C2 D1 FOLFOXIRI  10/9/2023: C6 D1 FOLFOXIRI  10/20/2023: Chemotherapy dose reduced by 20% due to thrombocytopenia.    10/27/2023: CT chest abdomen pelvis with contrast done at [facility] revealed patient's known rectal malignancy not well-visualized.  There was decreased surrounding perirectal fat stranding and no evidence of abdominopelvic metastatic disease.  CT chest was negative for any evidence of metastatic disease.    11/13/2023: C8-D1 FOLFOXIRI     11/30/2023: CT simulation for neoadjuvant radiation with concurrent 5-FU.    12/11/2023: Patient started on neoadjuvant radiation with weekly 5-FU infusion.     12/27/23: Patient presents for initial consultation today, She is undergoing neoadjuvant ChemoRT and is tolerating therapy relatively well. Denied any significant treatment related adverse effects presently,  except for fatigue. No weight/appetite changes, no Rectal bleeding. Cancer history as above.    1/8/23: patient resumed her concurrent ChemoRT at [facility].    3/20/24: CT Chest/Abdomen/Pelvis:  IMPRESSION:    1.There is a nonspecific 5 mm part solid nodule within the left upper lobe. No priors are available for direct comparison. Recommend correlation with prior outside imaging if available  2.Otherwise, no evidence of metastatic disease within the chest.   3. No evidence of metastatic disease within the abdomen.     3/21/24: MRI pelvis:  IMPRESSION:  1.Posttreatment changes in the upper rectum without definitive evidence for residual tumor at the treatment site.  2.Small perirectal fluid collection measuring 1.6 cm extends from the 12:00 position of the upper rectum into the anterior peritoneal reflection. This may represent a small abscess or a sinus tract. This collection/tract does not appear to communicate with the uterus or vagina.  3.No evidence for pelvic metastatic disease.     3/22/24: Sigmoidoscopy and Rectal Biopsy:  FINAL DIAGNOSIS:   A. Rectal scar, biopsy:   - Reactive epithelium with pools of mucin.   - Focal stromal reactive atypia, negative CK AE1/3 immunostain is supportive.   - Negative for dysplasia or malignancy.   - Multiple levels examined.   COMMENT:   The superficial nature of the biopsy precludes evaluation of deeper layers of the rectal wall.   Multiple levels examined. Clinical and endoscopic correlation is recommended.     5/7/2024:Patient seen today for follow up, She has completed neoadjuvant chemotherapy and Chemoradiation as above. She was thereafter evalauted by Dr. Fairchild and underwent rectal biopsy which did not show any evidence of residual disease. Follow up imaging including MRI also consistent with Complete response. She was not considered a candidate for LAR/curative surgery given complete response.     Patient is doing well today. She denied any significant  complaints today including any GI issues. She is interested in having her port removed. Has mild neuropathy which has improved somewhat following completion of treatment. ROS otherwise negative.     7/23/2024: MRI pelvis with without contrast:  IMPRESSION:     Since 06/26/2023, the posttreatment primary tumor and extramural disease shows: near complete response.   Post-treatment category: ymrT1/2, ymrN0   Circumferential resection margin: Not applicable   Sphincter involvement: No   Suspicious extra mesorectal lymph nodes: No         10/25/24:  Rectal scar, biopsy:   - At least intra-mucosal carcinoma, suspicious to represent recurrent invasive adenocarcinoma   COMMENT:   It is not possible to make a definitive diagnosis of invasive adenocarcinoma because the sample   is markedly fragmented, the dysplastic glands observed are associated with necroinflammatory   debris and granulation tissue.  In addition, there is no definitive desmoplasia seen. In the   context of prior rectal cancer, the findings are suspicious to represent recurrent cancer.     10/30/24: Patient seen today for follow-up.  She has recently undergone sigmoidoscopy and pelvic MRI at [facility] as above.  Clinically doing well overall.  Denied any acute complaints presently.  Peripheral neuropathy stable.     11/5/24: CT Chest/Abdomen/Pelvis W contrast:  Impression:   1.No definite findings of new metastatic disease in the chest, abdomen, or pelvis at this time.   2.Limited evaluation of the rectum which is mostly nondistended on this exam. No definite CT evidence of recurrent malignancy.   3.5 mm subsolid nodule in the left upper lobe appears stable.   4.Hiatal hernia containing the upper stomach.   5.Mild fatty infiltration of the liver.       11.25.24: Rectum. low anterior resection:   - Invasive mucinous adenocarcinoma, moderately differentiated,     pT2 N0.   - Absent response to treatment (score 3).   - Negative surgical margins.   - Seventeen  lymph nodes, negative for carcinoma (0/17).     12/13/24: Patient seen today for follow-up after recent bowel surgery/colectomy for local recurrence of colon cancer.  She is status post diverting ileostomy.  Clinically doing well.  Denied any significant issues with ileostomy.  She is tentatively planned for ileostomy reversal in late January - February 2025.    5/14/25: S/p ileostomy reversal in Feb 2025. Overall doing well. No acute complaints. Did not have CT scans since last visit. She appears  Somewhat confused about her appointments, and insists on having the port taken out.Takes MVI and calcium supplements,    5/15/25: CT chest/Abd/Pelvis W contrast:  Impression:   1.Stable groundglass pulmonary nodule left upper lobe.   2.Hiatal hernia.   3.Hepatic steatosis.   4.Postsurgical changes involving the rectum. There is some soft tissue attenuation in the presacral/coccygeal area that could be posttreatment in nature.   5.Degenerative change lumbar spine.   6.There is a tract in the subcutaneous soft tissues of the right quadrant of the abdomen that could be related to prior diverting ileostomy.     Subjective:  5/29/25: Patient Xochitl Hanson was located at home and [facility]phone [facility]. patient and I were able to hear [and see] each other simultaneously in real time. I introduced myself and verified patient's identity. I explained how the telemedicine visit will occur. I advised the patient that technology-related delays and breaches of privacy are potential risks associated with conducting the encounter via telemedicine.    She is clinically doing well and denied any new complaints.    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    Rectal cancer 06/27/2023       Past Surgical History:   Procedure Laterality Date    ENDOMETRIAL ABLATION      TUBAL ABDOMINAL LIGATION           Current Outpatient Medications:     ALPRAZolam (XANAX) 0.5 MG tablet, Take 1 tablet by mouth 2 (Two) Times a Day As Needed., Disp: , Rfl:     Biotin 1 MG capsule, 1 Tab, Oral, Daily, 0 Refill(s), Disp: , Rfl:     Calcium Carb-Cholecalciferol (Calcium 500 + D) 500-5 MG-MCG tablet per tablet, 1 Tab, Chew, BID, 0 Refill(s), Disp: , Rfl:     DULoxetine HCl 40 MG capsule delayed-release particles, 1 capsule Daily., Disp: , Rfl:     esomeprazole (nexIUM) 40 MG capsule, Take 1 capsule by mouth Every Morning Before Breakfast., Disp: , Rfl:     HYDROcodone-acetaminophen (NORCO)  MG per tablet, TAKE 1 TABLET BY MOUTH THREE TIMES A DAY AS NEEDED FOR 7 DAYS, Disp: , Rfl:     lisinopril (PRINIVIL,ZESTRIL) 20 MG tablet, Take 1 tablet by mouth Daily., Disp: , Rfl:     multivitamin (THERAGRAN) tablet tablet, Take 1 tablet by mouth Daily., Disp: , Rfl:     No Known Allergies    Family History   Problem Relation Age of Onset    Prostate cancer Brother        Cancer-related family history includes Prostate cancer in her brother.      Social History     Tobacco Use    Smoking status: Never    Smokeless tobacco: Never   Vaping Use    Vaping status: Never Used   Substance Use Topics    Alcohol use: Never    Drug use: Never     Social History     Social History Narrative    Not on file       ROS:   Review of Systems   Constitutional: Negative.    HENT: Negative.     Eyes: Negative.    Respiratory: Negative.      Cardiovascular: Negative.    Gastrointestinal: Negative.    Endocrine: Negative.    Genitourinary: Negative.    Musculoskeletal: Negative.    Skin: Negative.    Allergic/Immunologic: Negative.    Neurological:  Positive for numbness (bilateral hand and feet numbness).   Hematological: Negative.    Psychiatric/Behavioral: Negative.           Objective:    Vital Signs:  There were no vitals filed for this visit.    There is no height or weight on file to calculate BMI.    ECOG  (0) Fully active, able to carry on all predisease performance without restriction    Physical Exam:   Physical Exam  Constitutional:       Appearance: Normal appearance. She is normal weight.   HENT:      Head: Normocephalic and atraumatic.      Right Ear: External ear normal.      Left Ear: External ear normal.      Nose: Nose normal.      Mouth/Throat:      Mouth: Mucous membranes are moist.      Pharynx: Oropharynx is clear.   Eyes:      Extraocular Movements: Extraocular movements intact.      Conjunctiva/sclera: Conjunctivae normal.      Pupils: Pupils are equal, round, and reactive to light.   Cardiovascular:      Rate and Rhythm: Normal rate and regular rhythm.      Pulses: Normal pulses.      Heart sounds: Normal heart sounds.   Pulmonary:      Effort: Pulmonary effort is normal.      Breath sounds: Normal breath sounds.   Abdominal:      General: Abdomen is flat. Bowel sounds are normal.      Palpations: Abdomen is soft.   Musculoskeletal:         General: Normal range of motion.      Cervical back: Normal range of motion and neck supple.   Skin:     General: Skin is warm.   Neurological:      Mental Status: She is alert.   Psychiatric:         Mood and Affect: Mood normal.         Behavior: Behavior normal.         Thought Content: Thought content normal.         Judgment: Judgment normal.         Lab Results - Last 18 Months   Lab Units 05/14/25  1148 03/06/25  0835 12/13/24  1048   WBC 10*3/mm3 5.00 4.67 8.62   HEMOGLOBIN g/dL  "13.3 12.0 12.8   HEMATOCRIT % 41.0 38.1 39.5   PLATELETS 10*3/mm3 315 266 374   MCV fL 95.8 98.4* 96.8       No results for input(s): \"APTT\", \"INR\", \"PTT\" in the last 66053 hours.      Lab Results   Component Value Date    PTT 23.5 (L) 07/19/2023    INR <0.93 (L) 07/19/2023 11/25/24: Synoptic Report:  Procedure: Â Low anterior resection   Macroscopic Evaluation of Mesorectum (required for rectal cancers):     Complete (radial margin is   on black ink, blue ink is only serosa, see gross pictures)   Tumor Site:  Rectum     Rectal Tumor Location:   Straddles anterior peritoneal reflection   Histologic Type:  Mucinous adenocarcinoma   Histologic Grade:  G2, moderately differentiated   Tumor Size: Â 14.3 cm   Multiple Primary Sites:   Not applicable (no additional primary site(s) present)   Tumor Extent:  Invades into muscularis propria   Sub-mucosal Invasion (required only for pT1 tumors):    Not applicable (not a pT1 tumor)   Macroscopic Tumor Perforation:   Not identified   Lymphatic and / or Vascular Invasion:   Not identified   Perineural Invasion:   Not identified   Tumor Budding Score:  Low (0-4)   Treatment Effect:  Absent, with extensive residual cancer and no evident tumor regression (poor   or no response, score 3)   Margin Status for Invasive Carcinoma:   All margins negative for invasive carcinoma     Closest Margin(s) to Invasive Carcinoma:     Radial (circumferential)     Distance from Invasive Carcinoma to Closest Margin:    12 mm (slide A9, black ink)    Distance from Invasive Carcinoma to Distal Margin:    3.1 cm     Regional Lymph Node Status:   All regional lymph nodes negative for tumor     Number of Lymph Nodes with Tumor:   0     Number of Lymph Nodes Examined  Â : 17     Tumor Deposits: Â Not identified     Distant Site(s) Involved:   Not applicable     pTNM CLASSIFICATION (AJCC 8th Edition):  Â   yr pT2 N0     CASE SUMMARY: -Patient presented in January 2024 for establishment of care, outside " medical records, imaging findings, disease prognosis and treatment options were reviewed with the patient.  -Patient has completed Neoadjuvant Chemotherapy with FOLFOXIRI X 8 cycles with minimal adverse effects and no residual toxicities, her performance status is near baseline.  -Recent imaging on 10/27/23 as per outside records showed good treatment response.  -Patient has completed Neoadjuvant Chemo-XRT with Concurrent 5FU infusion [weekly 5FU at 275mg/m2 dose over 96 hour infusion (M-F)]. She tolerated treatment well.  -Noted to have Radiographic and pathologic CR following treatment, patient was evaluated by Dr. Fairchild and was not thought to be candidate for definitive resection/LAR given CR and was instead recommended for surveillance.    Assessment & Plan :      Stage III Rectal Adenocarcinoma: MSI-H  Local Recurrence in 10//2024, s/p LAR: ypT2N0  -Patient is status post total neoadjuvant therapy with FOLFOXIRI for 8 cycles followed by concurrent chemoradiation.  Initially planned for enhanced surveillance alone/nonsurgical management noted to have local disease recurrence on recent rectal biopsy in October 2024.  -Status post low anterior resection 2524.  Surgical path is consistent with early-stage disease [ypT2N0]   -Discussed these findings in detail with patient, given early stage and localized disease recurrence without any evidence of lavelle involvement or distant metastatic disease, little benefit from additional chemotherapy at this point in my view.  -Will continue with Surveillance with labs and imaging every 3-4 months for now.  -3-month follow-up with restaging scans and labs.  -CEA levels continue to be WNL.  Signatera CT DNA negative in march 2025. Recheck today  -Restaging scans reviewed as above, not concerning for recurrent/metastatic disease. Plan for repeat CT Scans and labs in about 4 months to follow up on Documented Lung nodules on recent scans.      Peripheral neuropathy: Grade 1,  has improved following completion of treatment. Will continue to monitor. Patient is on MVI supplement which is acceptable. B12 low normal.  Symptoms have improved.    Macrocytic anemia: Likely Sec to Chemotherapy.  B12 low normal, Folate WNL.  Iron panel showed features of ACD with likely YRN component. Will monitor and consider Iron infusions as needed. Hb/Hct near normal.    Port: Discussed with pt regarding pros and cons of port removal. She wants to have it removed now.. Will refer her to IR for this.    Ileostomy: s/p Reversal at [facility] by Dr. Fairchild. Denied any GI issues presently.    Insurance issues: She is worried that she would not be able to afford health insurance next year and may have to miss out on her appts and cancer surveillance. Will refer her to financial counsellor to discuss further    Follow up in  4 months with repeat labs and scans. Sooner as needed.      Thank you very much for providing the opportunity to participate in this patient’s care. Please do not hesitate to call if there are any other questions.

## 2025-05-27 ENCOUNTER — TELEPHONE (OUTPATIENT)
Dept: ONCOLOGY | Facility: CLINIC | Age: 65
End: 2025-05-27
Payer: COMMERCIAL

## 2025-05-27 NOTE — TELEPHONE ENCOUNTER
Spoke with patient and informed her we do not get auth for these tests before obtaining them that usually the company deals with the insurance part of their testing. Provided patient with raghu's phone number

## 2025-05-27 NOTE — TELEPHONE ENCOUNTER
Caller: Xochitl Hanson    Relationship: Self    Best call back number: [phone]    What was the call regarding: PT STATES HER LEANNE TESTING WAS DENIED BY INS BECAUSE NO AUTH WAS SUBMITTED    PLEASE ADVISE

## 2025-05-28 ENCOUNTER — TELEPHONE (OUTPATIENT)
Dept: ONCOLOGY | Facility: CLINIC | Age: 65
End: 2025-05-28

## 2025-05-28 NOTE — TELEPHONE ENCOUNTER
Caller: Xochitl Hanson    Relationship: Self    Best call back number: [phone]      What was the call regarding: PATIENT WANTED TO SEE IF HER PROCEDURE TOMORROW TO HAVE HER PORT REMOVED WAS APPROVED BY HER INSURANCE

## 2025-05-29 ENCOUNTER — HOSPITAL ENCOUNTER (OUTPATIENT)
Dept: INTERVENTIONAL RADIOLOGY/VASCULAR | Facility: HOSPITAL | Age: 65
Discharge: HOME | End: 2025-05-29
Payer: COMMERCIAL

## 2025-05-29 ENCOUNTER — TELEMEDICINE (OUTPATIENT)
Dept: ONCOLOGY | Facility: CLINIC | Age: 65
End: 2025-05-29
Payer: COMMERCIAL

## 2025-05-29 VITALS
HEART RATE: 68 BPM | WEIGHT: 180 LBS | RESPIRATION RATE: 15 BRPM | OXYGEN SATURATION: 96 % | DIASTOLIC BLOOD PRESSURE: 65 MMHG | TEMPERATURE: 98.1 F | BODY MASS INDEX: 28.93 KG/M2 | HEIGHT: 66 IN | SYSTOLIC BLOOD PRESSURE: 133 MMHG

## 2025-05-29 DIAGNOSIS — G62.9 NEUROPATHY: ICD-10-CM

## 2025-05-29 DIAGNOSIS — D64.9 ANEMIA, UNSPECIFIED TYPE: ICD-10-CM

## 2025-05-29 DIAGNOSIS — C20 RECTAL CANCER: Primary | ICD-10-CM

## 2025-05-29 DIAGNOSIS — D50.9 IRON DEFICIENCY ANEMIA, UNSPECIFIED IRON DEFICIENCY ANEMIA TYPE: ICD-10-CM

## 2025-05-29 LAB
APTT PPP: 25.3 SECONDS (ref 22.7–35.4)
BASOPHILS # BLD AUTO: 0.03 10*3/MM3 (ref 0–0.2)
BASOPHILS NFR BLD AUTO: 0.5 % (ref 0–1.5)
DEPRECATED RDW RBC AUTO: 47.8 FL (ref 37–54)
EOSINOPHIL # BLD AUTO: 0.25 10*3/MM3 (ref 0–0.4)
EOSINOPHIL NFR BLD AUTO: 4 % (ref 0.3–6.2)
ERYTHROCYTE [DISTWIDTH] IN BLOOD BY AUTOMATED COUNT: 13.5 % (ref 12.3–15.4)
HCT VFR BLD AUTO: 42.4 % (ref 34–46.6)
HGB BLD-MCNC: 13.9 G/DL (ref 12–15.9)
IMM GRANULOCYTES # BLD AUTO: 0.01 10*3/MM3 (ref 0–0.05)
IMM GRANULOCYTES NFR BLD AUTO: 0.2 % (ref 0–0.5)
INR PPP: 0.91 (ref 0.9–1.1)
LYMPHOCYTES # BLD AUTO: 1.16 10*3/MM3 (ref 0.7–3.1)
LYMPHOCYTES NFR BLD AUTO: 18.6 % (ref 19.6–45.3)
MCH RBC QN AUTO: 31 PG (ref 26.6–33)
MCHC RBC AUTO-ENTMCNC: 32.8 G/DL (ref 31.5–35.7)
MCV RBC AUTO: 94.4 FL (ref 79–97)
MONOCYTES # BLD AUTO: 0.53 10*3/MM3 (ref 0.1–0.9)
MONOCYTES NFR BLD AUTO: 8.5 % (ref 5–12)
MRSA DNA SPEC QL NAA+PROBE: NORMAL
NEUTROPHILS NFR BLD AUTO: 4.27 10*3/MM3 (ref 1.7–7)
NEUTROPHILS NFR BLD AUTO: 68.2 % (ref 42.7–76)
NRBC BLD AUTO-RTO: 0 /100 WBC (ref 0–0.2)
PLATELET # BLD AUTO: 287 10*3/MM3 (ref 140–450)
PMV BLD AUTO: 9 FL (ref 6–12)
PROTHROMBIN TIME: 12.1 SECONDS (ref 11.7–14.2)
RBC # BLD AUTO: 4.49 10*6/MM3 (ref 3.77–5.28)
WBC NRBC COR # BLD AUTO: 6.25 10*3/MM3 (ref 3.4–10.8)

## 2025-05-29 PROCEDURE — 77001 FLUOROGUIDE FOR VEIN DEVICE: CPT

## 2025-05-29 PROCEDURE — 25010000002 FENTANYL CITRATE (PF) 50 MCG/ML SOLUTION: Performed by: RADIOLOGY

## 2025-05-29 PROCEDURE — 85610 PROTHROMBIN TIME: CPT | Performed by: RADIOLOGY

## 2025-05-29 PROCEDURE — 99152 MOD SED SAME PHYS/QHP 5/>YRS: CPT

## 2025-05-29 PROCEDURE — 99214 OFFICE O/P EST MOD 30 MIN: CPT | Performed by: STUDENT IN AN ORGANIZED HEALTH CARE EDUCATION/TRAINING PROGRAM

## 2025-05-29 PROCEDURE — 25010000002 MIDAZOLAM PER 1 MG: Performed by: RADIOLOGY

## 2025-05-29 PROCEDURE — 25010000002 CEFAZOLIN PER 500 MG: Performed by: RADIOLOGY

## 2025-05-29 PROCEDURE — 99153 MOD SED SAME PHYS/QHP EA: CPT

## 2025-05-29 PROCEDURE — 85730 THROMBOPLASTIN TIME PARTIAL: CPT | Performed by: RADIOLOGY

## 2025-05-29 PROCEDURE — 85025 COMPLETE CBC W/AUTO DIFF WBC: CPT | Performed by: RADIOLOGY

## 2025-05-29 PROCEDURE — 25010000002 LIDOCAINE 1% - EPINEPHRINE 1:100000 1 %-1:100000 SOLUTION

## 2025-05-29 PROCEDURE — 25010000002 LIDOCAINE 1 % SOLUTION: Performed by: RADIOLOGY

## 2025-05-29 PROCEDURE — 87641 MR-STAPH DNA AMP PROBE: CPT | Performed by: STUDENT IN AN ORGANIZED HEALTH CARE EDUCATION/TRAINING PROGRAM

## 2025-05-29 PROCEDURE — 25010000002 ONDANSETRON PER 1 MG: Performed by: RADIOLOGY

## 2025-05-29 RX ORDER — LIDOCAINE HYDROCHLORIDE AND EPINEPHRINE 10; 10 MG/ML; UG/ML
INJECTION, SOLUTION INFILTRATION; PERINEURAL AS NEEDED
Status: COMPLETED | OUTPATIENT
Start: 2025-05-29 | End: 2025-05-29

## 2025-05-29 RX ORDER — SODIUM CHLORIDE 0.9 % (FLUSH) 0.9 %
10 SYRINGE (ML) INJECTION AS NEEDED
Status: DISCONTINUED | OUTPATIENT
Start: 2025-05-29 | End: 2025-05-31 | Stop reason: HOSPADM

## 2025-05-29 RX ORDER — MIDAZOLAM HYDROCHLORIDE 1 MG/ML
INJECTION, SOLUTION INTRAMUSCULAR; INTRAVENOUS AS NEEDED
Status: COMPLETED | OUTPATIENT
Start: 2025-05-29 | End: 2025-05-29

## 2025-05-29 RX ORDER — LIDOCAINE HYDROCHLORIDE 10 MG/ML
INJECTION, SOLUTION INFILTRATION; PERINEURAL AS NEEDED
Status: COMPLETED | OUTPATIENT
Start: 2025-05-29 | End: 2025-05-29

## 2025-05-29 RX ORDER — ONDANSETRON 2 MG/ML
INJECTION INTRAMUSCULAR; INTRAVENOUS AS NEEDED
Status: COMPLETED | OUTPATIENT
Start: 2025-05-29 | End: 2025-05-29

## 2025-05-29 RX ORDER — FENTANYL CITRATE 50 UG/ML
INJECTION, SOLUTION INTRAMUSCULAR; INTRAVENOUS AS NEEDED
Status: COMPLETED | OUTPATIENT
Start: 2025-05-29 | End: 2025-05-29

## 2025-05-29 RX ORDER — SODIUM CHLORIDE 9 MG/ML
75 INJECTION, SOLUTION INTRAVENOUS CONTINUOUS
Status: DISPENSED | OUTPATIENT
Start: 2025-05-29 | End: 2025-05-29

## 2025-05-29 RX ORDER — SODIUM CHLORIDE 0.9 % (FLUSH) 0.9 %
10 SYRINGE (ML) INJECTION EVERY 12 HOURS SCHEDULED
Status: DISCONTINUED | OUTPATIENT
Start: 2025-05-29 | End: 2025-05-31 | Stop reason: HOSPADM

## 2025-05-29 RX ADMIN — MIDAZOLAM 0.5 MG: 1 INJECTION INTRAMUSCULAR; INTRAVENOUS at 08:48

## 2025-05-29 RX ADMIN — ONDANSETRON 4 MG: 2 INJECTION INTRAMUSCULAR; INTRAVENOUS at 08:34

## 2025-05-29 RX ADMIN — LIDOCAINE HYDROCHLORIDE,EPINEPHRINE BITARTRATE 10 ML: 10; .01 INJECTION, SOLUTION INFILTRATION; PERINEURAL at 08:55

## 2025-05-29 RX ADMIN — MIDAZOLAM 0.5 MG: 1 INJECTION INTRAMUSCULAR; INTRAVENOUS at 08:45

## 2025-05-29 RX ADMIN — FENTANYL CITRATE 50 MCG: 50 INJECTION, SOLUTION INTRAMUSCULAR; INTRAVENOUS at 08:48

## 2025-05-29 RX ADMIN — CEFAZOLIN 1 G: 1 INJECTION, POWDER, FOR SOLUTION INTRAMUSCULAR; INTRAVENOUS at 08:35

## 2025-05-29 RX ADMIN — FENTANYL CITRATE 50 MCG: 50 INJECTION, SOLUTION INTRAMUSCULAR; INTRAVENOUS at 08:46

## 2025-05-29 RX ADMIN — LIDOCAINE HYDROCHLORIDE 10 ML: 10 INJECTION, SOLUTION INFILTRATION; PERINEURAL at 08:55

## 2025-05-29 NOTE — H&P
[facility]   Interventional Radiology H&P    Patient Name: Xochitl Hanson  : 1960  MRN: [id number]  Primary Care Physician:  Eliza Lemus APRN  Referring Physician: Krunal Darby MD  Date of admission: 2025    Subjective   Subjective     HPI:  Xochitl Hanson is a 65 y.o. female with history of rectal cancer no longer requiring port.    Review of Systems:   Constitutional no fever,  no weight loss       Otolaryngeal no difficulty swallowing   Cardiovascular no chest pain   Pulmonary no cough, no sputum production   Gastrointestinal no constipation, no diarrhea                         Personal History       Past Medical/Surgical History:   Past Medical History:   Diagnosis Date    Anxiety 2021    Arthropathy of cervical facet joint 2017    Bipolar II disorder 2022    Chronic pain 01/10/2023    Dysthymia 01/10/2023    Encounter for tubal ligation 2021    Essential hypertension 01/10/2023    Gastro-esophageal reflux disease without esophagitis 2021    Generalized anxiety disorder 2022    Hiatal hernia 2023    Hormone replacement therapy 2022    Hyperlipidemia 2022    Menopausal and postmenopausal disorder 2022    Metabolic syndrome 2022    Post endometrial ablation syndrome 2021    Rectal cancer 2023     Past Surgical History:   Procedure Laterality Date    ENDOMETRIAL ABLATION      TUBAL ABDOMINAL LIGATION         Social History:  reports that she has never smoked. She has never used smokeless tobacco. She reports that she does not drink alcohol and does not use drugs.    Medications:  (Not in a hospital admission)    Current medications:  sodium chloride, 10 mL, Intravenous, Q12H      Current IV drips:  ceFAZolin,   sodium chloride, 75 mL/hr        Allergies:  No Known Allergies    Objective    Objective     Vitals:   Temp:  [98.1 °F (36.7 °C)] 98.1 °F (36.7 °C)  Heart Rate:  [66] 66  Resp:  [13]  "13  BP: (135)/(74) 135/74      Physical Exam:   Constitutional: Awake, alert, No acute distress    Respiratory: Clear to auscultation bilaterally, nonlabored respirations    Cardiovascular: RRR, no murmurs, rubs, or gallops, palpable pedal pulses bilaterally   Gastrointestinal: Positive bowel sounds, soft, nontender, nondistended        ASA SCALE ASSESSMENT:  2-Mild to moderate systemic disease, medically well controlled, with no functional limitation    MALLAMPATI CLASSIFICATION:  2-Able to visualize the soft palate, fauces, uvula. The anterior & posterior tonsilar pillars are hidden by the tongue.       Result Review        Result Review:     No results found for: \"NA\"    No results found for: \"K\"    No results found for: \"CL\"    No results found for: \"PLASMABICARB\"    No results found for: \"BUN\"    No results found for: \"CREATININE\"    No results found for: \"CALCIUM\"        No components found for: \"GLUCOSE.*\"  Results from last 7 days   Lab Units 05/29/25  0731   WBC 10*3/mm3 6.25   HEMOGLOBIN g/dL 13.9   HEMATOCRIT % 42.4   PLATELETS 10*3/mm3 287      Results from last 7 days   Lab Units 05/29/25  0731   INR  0.91           Assessment / Plan     Assesment:   History of rectal cancer no longer requiring port.      Plan:   Port removal.     The risks and benefits of the procedure were discussed with the patient.    Electronically signed by HAILEY Aceves, 05/29/25, 8:40 AM EDT.  "